# Patient Record
Sex: MALE | Race: WHITE | NOT HISPANIC OR LATINO | Employment: OTHER | URBAN - METROPOLITAN AREA
[De-identification: names, ages, dates, MRNs, and addresses within clinical notes are randomized per-mention and may not be internally consistent; named-entity substitution may affect disease eponyms.]

---

## 2017-03-17 ENCOUNTER — TRANSCRIBE ORDERS (OUTPATIENT)
Dept: ADMINISTRATIVE | Facility: HOSPITAL | Age: 57
End: 2017-03-17

## 2017-03-17 DIAGNOSIS — Z13.9 SCREENING: Primary | ICD-10-CM

## 2017-04-04 ENCOUNTER — HOSPITAL ENCOUNTER (OUTPATIENT)
Dept: CARDIOLOGY | Facility: HOSPITAL | Age: 57
Discharge: HOME | End: 2017-04-04
Attending: INTERNAL MEDICINE | Admitting: INTERNAL MEDICINE

## 2017-04-04 VITALS
DIASTOLIC BLOOD PRESSURE: 71 MMHG | WEIGHT: 196 LBS | HEART RATE: 79 BPM | SYSTOLIC BLOOD PRESSURE: 127 MMHG | HEIGHT: 67 IN | BODY MASS INDEX: 30.76 KG/M2

## 2017-04-04 DIAGNOSIS — Z13.9 SCREENING: ICD-10-CM

## 2017-04-04 LAB
BH CV ECHO MEAS - DIST AO DIAM: 1.42 CM
BH CV VAS BP LEFT ARM: NORMAL MMHG
BH CV VAS BP RIGHT ARM: NORMAL MMHG
BH CV XLRA MEAS - MID AO DIAM: 1.46 CM
BH CV XLRA MEAS - PAD LEFT ABI DP: 1.02
BH CV XLRA MEAS - PAD LEFT ABI PT: 1
BH CV XLRA MEAS - PAD LEFT ARM: 127 MMHG
BH CV XLRA MEAS - PAD LEFT LEG DP: 130 MMHG
BH CV XLRA MEAS - PAD LEFT LEG PT: 128 MMHG
BH CV XLRA MEAS - PAD RIGHT ABI DP: NORMAL
BH CV XLRA MEAS - PAD RIGHT ABI PT: NORMAL
BH CV XLRA MEAS - PAD RIGHT ARM: NORMAL MMHG
BH CV XLRA MEAS - PAD RIGHT LEG DP: NORMAL MMHG
BH CV XLRA MEAS - PAD RIGHT LEG PT: NORMAL MMHG
BH CV XLRA MEAS - PROX AO DIAM: 1.55 CM
BH CV XLRA MEAS LEFT ICA/CCA RATIO: 1.33
BH CV XLRA MEAS LEFT MID CCA PSV: NORMAL CM/SEC
BH CV XLRA MEAS LEFT MID ICA PSV: NORMAL CM/SEC
BH CV XLRA MEAS LEFT PROX ECA PSV: NORMAL CM/SEC
BH CV XLRA MEAS RIGHT ICA/CCA RATIO: 1.03
BH CV XLRA MEAS RIGHT MID CCA PSV: NORMAL CM/SEC
BH CV XLRA MEAS RIGHT MID ICA PSV: NORMAL CM/SEC
BH CV XLRA MEAS RIGHT PROX ECA PSV: NORMAL CM/SEC

## 2017-04-04 PROCEDURE — 93799 UNLISTED CV SVC/PROCEDURE: CPT

## 2019-12-25 ENCOUNTER — HOSPITAL ENCOUNTER (EMERGENCY)
Facility: HOSPITAL | Age: 59
Discharge: HOME | End: 2019-12-25
Attending: EMERGENCY MEDICINE | Admitting: EMERGENCY MEDICINE

## 2019-12-25 ENCOUNTER — APPOINTMENT (OUTPATIENT)
Dept: CT IMAGING | Facility: HOSPITAL | Age: 59
End: 2019-12-25

## 2019-12-25 ENCOUNTER — APPOINTMENT (OUTPATIENT)
Dept: GENERAL RADIOLOGY | Facility: HOSPITAL | Age: 59
End: 2019-12-25

## 2019-12-25 VITALS
HEART RATE: 85 BPM | TEMPERATURE: 98.3 F | SYSTOLIC BLOOD PRESSURE: 150 MMHG | RESPIRATION RATE: 18 BRPM | OXYGEN SATURATION: 96 % | HEIGHT: 67 IN | BODY MASS INDEX: 31.55 KG/M2 | DIASTOLIC BLOOD PRESSURE: 94 MMHG | WEIGHT: 201 LBS

## 2019-12-25 DIAGNOSIS — G57.91 NEUROPATHY OF RIGHT LOWER EXTREMITY: ICD-10-CM

## 2019-12-25 DIAGNOSIS — J18.9 PNEUMONIA OF RIGHT UPPER LOBE DUE TO INFECTIOUS ORGANISM: Primary | ICD-10-CM

## 2019-12-25 LAB
ALBUMIN SERPL-MCNC: 3.8 G/DL (ref 3.5–5.2)
ALBUMIN/GLOB SERPL: 1.3 G/DL
ALP SERPL-CCNC: 87 U/L (ref 39–117)
ALT SERPL W P-5'-P-CCNC: 44 U/L (ref 1–41)
ANION GAP SERPL CALCULATED.3IONS-SCNC: 12 MMOL/L (ref 5–15)
AST SERPL-CCNC: 26 U/L (ref 1–40)
BASOPHILS # BLD AUTO: 0.04 10*3/MM3 (ref 0–0.2)
BASOPHILS NFR BLD AUTO: 0.3 % (ref 0–1.5)
BILIRUB SERPL-MCNC: 1.1 MG/DL (ref 0.2–1.2)
BUN BLD-MCNC: 17 MG/DL (ref 6–20)
BUN/CREAT SERPL: 15 (ref 7–25)
CALCIUM SPEC-SCNC: 9.2 MG/DL (ref 8.6–10.5)
CHLORIDE SERPL-SCNC: 107 MMOL/L (ref 98–107)
CO2 SERPL-SCNC: 23 MMOL/L (ref 22–29)
CREAT BLD-MCNC: 1.13 MG/DL (ref 0.76–1.27)
DEPRECATED RDW RBC AUTO: 42.8 FL (ref 37–54)
EOSINOPHIL # BLD AUTO: 0.43 10*3/MM3 (ref 0–0.4)
EOSINOPHIL NFR BLD AUTO: 3 % (ref 0.3–6.2)
ERYTHROCYTE [DISTWIDTH] IN BLOOD BY AUTOMATED COUNT: 12.4 % (ref 12.3–15.4)
FLUAV AG NPH QL: NEGATIVE
FLUBV AG NPH QL IA: NEGATIVE
GFR SERPL CREATININE-BSD FRML MDRD: 67 ML/MIN/1.73
GLOBULIN UR ELPH-MCNC: 2.9 GM/DL
GLUCOSE BLD-MCNC: 98 MG/DL (ref 65–99)
HCT VFR BLD AUTO: 44.3 % (ref 37.5–51)
HGB BLD-MCNC: 14.9 G/DL (ref 13–17.7)
IMM GRANULOCYTES # BLD AUTO: 0.09 10*3/MM3 (ref 0–0.05)
IMM GRANULOCYTES NFR BLD AUTO: 0.6 % (ref 0–0.5)
LYMPHOCYTES # BLD AUTO: 0.94 10*3/MM3 (ref 0.7–3.1)
LYMPHOCYTES NFR BLD AUTO: 6.7 % (ref 19.6–45.3)
MCH RBC QN AUTO: 32.1 PG (ref 26.6–33)
MCHC RBC AUTO-ENTMCNC: 33.6 G/DL (ref 31.5–35.7)
MCV RBC AUTO: 95.5 FL (ref 79–97)
MONOCYTES # BLD AUTO: 1.25 10*3/MM3 (ref 0.1–0.9)
MONOCYTES NFR BLD AUTO: 8.8 % (ref 5–12)
NEUTROPHILS # BLD AUTO: 11.38 10*3/MM3 (ref 1.7–7)
NEUTROPHILS NFR BLD AUTO: 80.6 % (ref 42.7–76)
NRBC BLD AUTO-RTO: 0 /100 WBC (ref 0–0.2)
PLATELET # BLD AUTO: 255 10*3/MM3 (ref 140–450)
PMV BLD AUTO: 9.5 FL (ref 6–12)
POTASSIUM BLD-SCNC: 4.1 MMOL/L (ref 3.5–5.2)
PROT SERPL-MCNC: 6.7 G/DL (ref 6–8.5)
RBC # BLD AUTO: 4.64 10*6/MM3 (ref 4.14–5.8)
SODIUM BLD-SCNC: 142 MMOL/L (ref 136–145)
WBC NRBC COR # BLD: 14.13 10*3/MM3 (ref 3.4–10.8)

## 2019-12-25 PROCEDURE — 71260 CT THORAX DX C+: CPT

## 2019-12-25 PROCEDURE — 71046 X-RAY EXAM CHEST 2 VIEWS: CPT

## 2019-12-25 PROCEDURE — 96375 TX/PRO/DX INJ NEW DRUG ADDON: CPT

## 2019-12-25 PROCEDURE — 96376 TX/PRO/DX INJ SAME DRUG ADON: CPT

## 2019-12-25 PROCEDURE — 0 IOPAMIDOL PER 1 ML: Performed by: EMERGENCY MEDICINE

## 2019-12-25 PROCEDURE — 25010000002 HYDROMORPHONE PER 4 MG: Performed by: EMERGENCY MEDICINE

## 2019-12-25 PROCEDURE — 99284 EMERGENCY DEPT VISIT MOD MDM: CPT

## 2019-12-25 PROCEDURE — 99284 EMERGENCY DEPT VISIT MOD MDM: CPT | Performed by: EMERGENCY MEDICINE

## 2019-12-25 PROCEDURE — 85025 COMPLETE CBC W/AUTO DIFF WBC: CPT | Performed by: EMERGENCY MEDICINE

## 2019-12-25 PROCEDURE — 96374 THER/PROPH/DIAG INJ IV PUSH: CPT

## 2019-12-25 PROCEDURE — 80053 COMPREHEN METABOLIC PANEL: CPT | Performed by: EMERGENCY MEDICINE

## 2019-12-25 PROCEDURE — 25010000002 LORAZEPAM PER 2 MG: Performed by: EMERGENCY MEDICINE

## 2019-12-25 PROCEDURE — 87804 INFLUENZA ASSAY W/OPTIC: CPT | Performed by: EMERGENCY MEDICINE

## 2019-12-25 RX ORDER — LEVOTHYROXINE SODIUM 0.12 MG/1
125 TABLET ORAL EVERY EVENING
COMMUNITY

## 2019-12-25 RX ORDER — SIMVASTATIN 40 MG
40 TABLET ORAL EVERY OTHER DAY
COMMUNITY

## 2019-12-25 RX ORDER — SODIUM CHLORIDE 0.9 % (FLUSH) 0.9 %
10 SYRINGE (ML) INJECTION AS NEEDED
Status: DISCONTINUED | OUTPATIENT
Start: 2019-12-25 | End: 2019-12-25 | Stop reason: HOSPADM

## 2019-12-25 RX ORDER — HYDROMORPHONE HCL 110MG/55ML
0.5 PATIENT CONTROLLED ANALGESIA SYRINGE INTRAVENOUS ONCE
Status: COMPLETED | OUTPATIENT
Start: 2019-12-25 | End: 2019-12-25

## 2019-12-25 RX ORDER — LORAZEPAM 1 MG/1
1 TABLET ORAL 2 TIMES DAILY PRN
Qty: 15 TABLET | Refills: 0 | Status: SHIPPED | OUTPATIENT
Start: 2019-12-25

## 2019-12-25 RX ORDER — HYDROCODONE BITARTRATE AND ACETAMINOPHEN 5; 325 MG/1; MG/1
1 TABLET ORAL EVERY 4 HOURS PRN
Qty: 15 TABLET | Refills: 0 | Status: SHIPPED | OUTPATIENT
Start: 2019-12-25 | End: 2020-04-10 | Stop reason: HOSPADM

## 2019-12-25 RX ORDER — LORAZEPAM 2 MG/ML
1 INJECTION INTRAMUSCULAR ONCE
Status: COMPLETED | OUTPATIENT
Start: 2019-12-25 | End: 2019-12-25

## 2019-12-25 RX ORDER — GABAPENTIN 300 MG/1
300 CAPSULE ORAL 3 TIMES DAILY
Qty: 90 CAPSULE | Refills: 0 | Status: SHIPPED | OUTPATIENT
Start: 2019-12-25

## 2019-12-25 RX ORDER — DICLOFENAC SODIUM AND MISOPROSTOL 75; 200 MG/1; UG/1
1 TABLET, DELAYED RELEASE ORAL 2 TIMES DAILY
COMMUNITY
End: 2019-12-25

## 2019-12-25 RX ORDER — AZITHROMYCIN 250 MG/1
TABLET, FILM COATED ORAL
Qty: 4 TABLET | Refills: 0 | Status: SHIPPED | OUTPATIENT
Start: 2019-12-25 | End: 2020-04-08

## 2019-12-25 RX ORDER — NAPROXEN SODIUM 220 MG
220 TABLET ORAL 2 TIMES DAILY PRN
COMMUNITY
End: 2019-12-25

## 2019-12-25 RX ORDER — AZITHROMYCIN 250 MG/1
500 TABLET, FILM COATED ORAL ONCE
Status: COMPLETED | OUTPATIENT
Start: 2019-12-25 | End: 2019-12-25

## 2019-12-25 RX ADMIN — HYDROMORPHONE HYDROCHLORIDE 0.5 MG: 2 INJECTION, SOLUTION INTRAMUSCULAR; INTRAVENOUS; SUBCUTANEOUS at 11:29

## 2019-12-25 RX ADMIN — AZITHROMYCIN 500 MG: 250 TABLET, FILM COATED ORAL at 14:05

## 2019-12-25 RX ADMIN — HYDROMORPHONE HYDROCHLORIDE 0.5 MG: 2 INJECTION, SOLUTION INTRAMUSCULAR; INTRAVENOUS; SUBCUTANEOUS at 12:29

## 2019-12-25 RX ADMIN — LORAZEPAM 1 MG: 2 INJECTION INTRAMUSCULAR; INTRAVENOUS at 12:32

## 2019-12-25 RX ADMIN — IOPAMIDOL 100 ML: 755 INJECTION, SOLUTION INTRAVENOUS at 12:47

## 2019-12-25 NOTE — ED PROVIDER NOTES
EMERGENCY DEPARTMENT ENCOUNTER      Room Number: 5/05      HPI:    Chief complaint: Patient presents with upper respiratory symptoms and fever along with phantom leg pain.    Location: Phantom leg pain is in right lower extremity and originates near the point of prior AKA.    Quality/Severity: Fever to 102.5    Timing/Duration: Respiratory symptoms started approximately 3 days ago    Modifying Factors: Acetaminophen helps with fever    Associated Symptoms: General malaise    Narrative: Pt is a 59 y.o. male who presents complaining of upper respiratory symptoms, fever and phantom leg pain.  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  Patient does do note that whenever he is sick or has a fever the phantom pain is worse.  3-day history of cough, sore throat, hoarseness and fever.  Scant brown sputum production.  Patient recently exposed to a sick child.      PMD: Vasile Gross MD    REVIEW OF SYSTEMS  Review of Systems   Constitutional: Positive for activity change, appetite change, chills, fatigue and fever.   HENT: Positive for congestion (Nasal), sore throat and voice change. Negative for trouble swallowing.    Respiratory: Positive for cough. Negative for shortness of breath and wheezing.    Cardiovascular: Negative for chest pain and palpitations.   Gastrointestinal: Negative for abdominal pain, diarrhea, nausea and vomiting.   Genitourinary: Negative for dysuria, flank pain, hematuria and urgency.   Musculoskeletal: Positive for gait problem (Patient uses crutches for ambulation and has not used a prosthesis in several years). Negative for back pain.   Skin: Negative for rash.   Neurological: Negative for dizziness, weakness and headaches.   Psychiatric/Behavioral: Negative for confusion.   All other systems reviewed and are negative.      PAST MEDICAL HISTORY  Active Ambulatory Problems     Diagnosis Date Noted   • No Active  Ambulatory Problems     Resolved Ambulatory Problems     Diagnosis Date Noted   • No Resolved Ambulatory Problems     Past Medical History:   Diagnosis Date   • Disease of thyroid gland    • Hyperlipidemia        PAST SURGICAL HISTORY  Past Surgical History:   Procedure Laterality Date   • BELOW KNEE LEG AMPUTATION  1989    right leg   • SINUS SURGERY         FAMILY HISTORY  Family History   Problem Relation Age of Onset   • Transient ischemic attack Mother    • Heart attack Brother    • Diabetes Brother    • Heart disease Brother         CAD w/ CABG       SOCIAL HISTORY  Social History     Socioeconomic History   • Marital status:      Spouse name: Not on file   • Number of children: Not on file   • Years of education: Not on file   • Highest education level: Not on file   Tobacco Use   • Smoking status: Never Smoker   Substance and Sexual Activity   • Alcohol use: Yes     Comment: rarely       ALLERGIES  Latex    PHYSICAL EXAM  ED Triage Vitals [12/25/19 1001]   Temp Heart Rate Resp BP SpO2   98.3 °F (36.8 °C) 80 18 169/99 97 %      Temp src Heart Rate Source Patient Position BP Location FiO2 (%)   -- -- -- -- --       Physical Exam   Constitutional: He is oriented to person, place, and time and well-developed, well-nourished, and in no distress.   HENT:   Head: Normocephalic and atraumatic.   Eyes: Conjunctivae and EOM are normal.   Neck: Normal range of motion. Neck supple. No thyromegaly present.   Cardiovascular: Normal rate and normal heart sounds.   No murmur heard.  Pulmonary/Chest: Effort normal and breath sounds normal. No respiratory distress.   Occasional dry cough noted   Abdominal: Soft. Bowel sounds are normal. There is no tenderness.   Musculoskeletal: He exhibits no edema.   Right AKA and stump with benign appearance   Lymphadenopathy:     He has no cervical adenopathy.   Neurological: He is alert and oriented to person, place, and time.   Skin: Skin is warm and dry.   Psychiatric: Affect  and judgment normal.   Nursing note and vitals reviewed.      LAB RESULTS  Results for orders placed or performed during the hospital encounter of 12/25/19   Influenza Antigen, Rapid - Swab, Nasopharynx   Result Value Ref Range    Influenza A Ag, EIA Negative Negative    Influenza B Ag, EIA Negative Negative   Comprehensive Metabolic Panel   Result Value Ref Range    Glucose 98 65 - 99 mg/dL    BUN 17 6 - 20 mg/dL    Creatinine 1.13 0.76 - 1.27 mg/dL    Sodium 142 136 - 145 mmol/L    Potassium 4.1 3.5 - 5.2 mmol/L    Chloride 107 98 - 107 mmol/L    CO2 23.0 22.0 - 29.0 mmol/L    Calcium 9.2 8.6 - 10.5 mg/dL    Total Protein 6.7 6.0 - 8.5 g/dL    Albumin 3.80 3.50 - 5.20 g/dL    ALT (SGPT) 44 (H) 1 - 41 U/L    AST (SGOT) 26 1 - 40 U/L    Alkaline Phosphatase 87 39 - 117 U/L    Total Bilirubin 1.1 0.2 - 1.2 mg/dL    eGFR Non African Amer 67 >60 mL/min/1.73    Globulin 2.9 gm/dL    A/G Ratio 1.3 g/dL    BUN/Creatinine Ratio 15.0 7.0 - 25.0    Anion Gap 12.0 5.0 - 15.0 mmol/L   CBC Auto Differential   Result Value Ref Range    WBC 14.13 (H) 3.40 - 10.80 10*3/mm3    RBC 4.64 4.14 - 5.80 10*6/mm3    Hemoglobin 14.9 13.0 - 17.7 g/dL    Hematocrit 44.3 37.5 - 51.0 %    MCV 95.5 79.0 - 97.0 fL    MCH 32.1 26.6 - 33.0 pg    MCHC 33.6 31.5 - 35.7 g/dL    RDW 12.4 12.3 - 15.4 %    RDW-SD 42.8 37.0 - 54.0 fl    MPV 9.5 6.0 - 12.0 fL    Platelets 255 140 - 450 10*3/mm3    Neutrophil % 80.6 (H) 42.7 - 76.0 %    Lymphocyte % 6.7 (L) 19.6 - 45.3 %    Monocyte % 8.8 5.0 - 12.0 %    Eosinophil % 3.0 0.3 - 6.2 %    Basophil % 0.3 0.0 - 1.5 %    Immature Grans % 0.6 (H) 0.0 - 0.5 %    Neutrophils, Absolute 11.38 (H) 1.70 - 7.00 10*3/mm3    Lymphocytes, Absolute 0.94 0.70 - 3.10 10*3/mm3    Monocytes, Absolute 1.25 (H) 0.10 - 0.90 10*3/mm3    Eosinophils, Absolute 0.43 (H) 0.00 - 0.40 10*3/mm3    Basophils, Absolute 0.04 0.00 - 0.20 10*3/mm3    Immature Grans, Absolute 0.09 (H) 0.00 - 0.05 10*3/mm3    nRBC 0.0 0.0 - 0.2 /100 WBC          I ordered the above labs and reviewed the results    RADIOLOGY  Xr Chest 2 View    Result Date: 12/25/2019  Narrative: CR Chest 2 Vws INDICATION:  Congestion and coughing for almost 3 weeks which is worsening COMPARISON:  None. FINDINGS: PA and lateral views of the chest.  There is an irregular density in the right perihilar region measuring up to 3.5 cm in diameter. Rest the lungs are clear. The heart size normal. The bones are normal      Impression: 3.5 cm irregular density in the right perihilar region. A mass cannot be excluded. Given the history I suggest CT chest with contrast. Pneumonia is still possibility Signer Name: Marek Mathew MD  Signed: 12/25/2019 12:07 PM  Workstation Name: [email or web address]    Ct Chest With Contrast    Result Date: 12/25/2019  Narrative: INDICATION: Congestion and coughing for 3 weeks. Abnormal chest x-ray suggesting right lung mass. TECHNIQUE: CT of the thorax with contrast. Coronal and sagittal reconstructions were obtained.  Radiation dose reduction techniques included automated exposure control or exposure modulation based on body size. Radiation audit for number of CT and nuclear cardiology exams performed in the last year: 0.  COMPARISON: Chest x-ray same day FINDINGS: . There is patchy infiltrate in the right upper lobe anteriorly. In total this involves an area about 9 cm maximum dimension. There are bronchograms. The airways are patent. The aorta is normal in size. Thyroid gland is normal. No adenopathy is identified. Upper abdominal images are normal. Bones are unremarkable.     Impression: Right lung infiltrate with patchy distribution involving the upper lobe. There is no suggestion of a mass. There is no adenopathy. Recommend follow-up chest x-ray after appropriate treatment and resolution of symptoms to ensure resolution of right lung abnormality. Otherwise study is negative Signer Name: Marek Mathew MD  Signed: 12/25/2019 1:04 PM   Workstation Name: [email or web address]  [facility] [place]      I ordered the above radiologic testing and reviewed the results    PROCEDURES  Procedures      PROGRESS AND CONSULTS  ED Course as of Dec 25 1530   Wed Dec 25, 2019   1313 Prior to chest CT patient and wife were apprised of all results and it was felt that round pneumonia was the most probable etiology.  Final CT results does confirm a pneumonia.    [ML]   1528 Diagnosis of right upper lobe pneumonia discussed with patient along with treatment plan, expectations and warnings.  Neuropathic right lower extremity pain also discussed along with the treatment plan using Neurontin.  Patient was advised to follow-up with a neurologist if he continues to have significant problems with phantom pain.    [ML]      ED Course User Index  [ML] Jose Maria Rea MD           MEDICAL DECISION MAKING  Results were reviewed/discussed with the patient and they were also made aware of online access. Pt also made aware that some labs, such as cultures, will not be resulted during ER visit and follow up with PMD is necessary.     MDM       DIAGNOSIS  Final diagnoses:   Pneumonia of right upper lobe due to infectious organism (CMS/HCC)   Neuropathy of right lower extremity       Latest Documented Vital Signs:  As of 3:30 PM  BP- 150/94 HR- 85 Temp- 98.3 °F (36.8 °C) O2 sat- 96%    DISPOSITION  Discharged in stable condition       Medication List      New Prescriptions    azithromycin 250 MG tablet  Commonly known as:  ZITHROMAX  1 tablet daily for 4 days.     gabapentin 300 MG capsule  Commonly known as:  NEURONTIN  Take 1 capsule by mouth 3 (Three) Times a Day.     HYDROcodone-acetaminophen 5-325 MG per tablet  Commonly known as:  NORCO  Take 1 tablet by mouth Every 4 (Four) Hours As Needed for Moderate Pain    for up to 15 doses.     LORazepam 1 MG tablet  Commonly known as:  ATIVAN  Take 1 tablet by mouth 2 (Two) Times a Day As Needed for Anxiety or Sleep.         Stop    diclofenac-miSOPROStol 75-0.2 MG EC tablet  Commonly known as:  ARTHROTEC 75     naproxen sodium 220 MG tablet  Commonly known as:  ALEVE          Follow-up Information     Vasile Gross MD.    Specialty:  Internal Medicine  Why:  As needed  Contact information:  [address] [place] [id number] [phone]                      Jose Maria Rea MD  12/25/19 0997

## 2020-04-08 ENCOUNTER — APPOINTMENT (OUTPATIENT)
Dept: GENERAL RADIOLOGY | Facility: HOSPITAL | Age: 60
End: 2020-04-08

## 2020-04-08 ENCOUNTER — APPOINTMENT (OUTPATIENT)
Dept: CT IMAGING | Facility: HOSPITAL | Age: 60
End: 2020-04-08

## 2020-04-08 ENCOUNTER — HOSPITAL ENCOUNTER (INPATIENT)
Facility: HOSPITAL | Age: 60
LOS: 2 days | Discharge: HOME HEALTH | End: 2020-04-10
Attending: EMERGENCY MEDICINE | Admitting: HOSPITALIST

## 2020-04-08 ENCOUNTER — PREP FOR SURGERY (OUTPATIENT)
Dept: OTHER | Facility: HOSPITAL | Age: 60
End: 2020-04-08

## 2020-04-08 DIAGNOSIS — S72.144A CLOSED NONDISPLACED INTERTROCHANTERIC FRACTURE OF RIGHT FEMUR, INITIAL ENCOUNTER (HCC): Primary | ICD-10-CM

## 2020-04-08 DIAGNOSIS — S72.141A CLOSED DISPLACED INTERTROCHANTERIC FRACTURE OF RIGHT FEMUR, INITIAL ENCOUNTER (HCC): ICD-10-CM

## 2020-04-08 LAB
ABO GROUP BLD: NORMAL
ABO GROUP BLD: NORMAL
ALBUMIN SERPL-MCNC: 3.8 G/DL (ref 3.5–5.2)
ALBUMIN/GLOB SERPL: 1.4 G/DL
ALP SERPL-CCNC: 81 U/L (ref 39–117)
ALT SERPL W P-5'-P-CCNC: 48 U/L (ref 1–41)
ANION GAP SERPL CALCULATED.3IONS-SCNC: 12 MMOL/L (ref 5–15)
AST SERPL-CCNC: 34 U/L (ref 1–40)
BASOPHILS # BLD AUTO: 0.04 10*3/MM3 (ref 0–0.2)
BASOPHILS NFR BLD AUTO: 0.5 % (ref 0–1.5)
BILIRUB SERPL-MCNC: 1.2 MG/DL (ref 0.2–1.2)
BLD GP AB SCN SERPL QL: NEGATIVE
BUN BLD-MCNC: 15 MG/DL (ref 6–20)
BUN/CREAT SERPL: 12.4 (ref 7–25)
CALCIUM SPEC-SCNC: 8.9 MG/DL (ref 8.6–10.5)
CHLORIDE SERPL-SCNC: 105 MMOL/L (ref 98–107)
CO2 SERPL-SCNC: 20 MMOL/L (ref 22–29)
CREAT BLD-MCNC: 1.21 MG/DL (ref 0.76–1.27)
DEPRECATED RDW RBC AUTO: 40.8 FL (ref 37–54)
EOSINOPHIL # BLD AUTO: 0.22 10*3/MM3 (ref 0–0.4)
EOSINOPHIL NFR BLD AUTO: 2.6 % (ref 0.3–6.2)
ERYTHROCYTE [DISTWIDTH] IN BLOOD BY AUTOMATED COUNT: 11.8 % (ref 12.3–15.4)
GFR SERPL CREATININE-BSD FRML MDRD: 62 ML/MIN/1.73
GLOBULIN UR ELPH-MCNC: 2.8 GM/DL
GLUCOSE BLD-MCNC: 100 MG/DL (ref 65–99)
HCT VFR BLD AUTO: 47.7 % (ref 37.5–51)
HGB BLD-MCNC: 16.2 G/DL (ref 13–17.7)
HOLD SPECIMEN: NORMAL
HOLD SPECIMEN: NORMAL
IMM GRANULOCYTES # BLD AUTO: 0.03 10*3/MM3 (ref 0–0.05)
IMM GRANULOCYTES NFR BLD AUTO: 0.4 % (ref 0–0.5)
LYMPHOCYTES # BLD AUTO: 1.31 10*3/MM3 (ref 0.7–3.1)
LYMPHOCYTES NFR BLD AUTO: 15.7 % (ref 19.6–45.3)
MCH RBC QN AUTO: 31.9 PG (ref 26.6–33)
MCHC RBC AUTO-ENTMCNC: 34 G/DL (ref 31.5–35.7)
MCV RBC AUTO: 93.9 FL (ref 79–97)
MONOCYTES # BLD AUTO: 0.63 10*3/MM3 (ref 0.1–0.9)
MONOCYTES NFR BLD AUTO: 7.5 % (ref 5–12)
NEUTROPHILS # BLD AUTO: 6.14 10*3/MM3 (ref 1.7–7)
NEUTROPHILS NFR BLD AUTO: 73.3 % (ref 42.7–76)
PLATELET # BLD AUTO: 263 10*3/MM3 (ref 140–450)
PMV BLD AUTO: 9.8 FL (ref 6–12)
POTASSIUM BLD-SCNC: 4.1 MMOL/L (ref 3.5–5.2)
PROT SERPL-MCNC: 6.6 G/DL (ref 6–8.5)
RBC # BLD AUTO: 5.08 10*6/MM3 (ref 4.14–5.8)
RH BLD: POSITIVE
RH BLD: POSITIVE
SODIUM BLD-SCNC: 137 MMOL/L (ref 136–145)
T&S EXPIRATION DATE: NORMAL
T4 FREE SERPL-MCNC: 1.53 NG/DL (ref 0.93–1.7)
TSH SERPL DL<=0.05 MIU/L-ACNC: 1.24 UIU/ML (ref 0.27–4.2)
WBC NRBC COR # BLD: 8.37 10*3/MM3 (ref 3.4–10.8)
WHOLE BLOOD HOLD SPECIMEN: NORMAL
WHOLE BLOOD HOLD SPECIMEN: NORMAL

## 2020-04-08 PROCEDURE — 84443 ASSAY THYROID STIM HORMONE: CPT | Performed by: PHYSICIAN ASSISTANT

## 2020-04-08 PROCEDURE — 86901 BLOOD TYPING SEROLOGIC RH(D): CPT

## 2020-04-08 PROCEDURE — 73502 X-RAY EXAM HIP UNI 2-3 VIEWS: CPT

## 2020-04-08 PROCEDURE — 85025 COMPLETE CBC W/AUTO DIFF WBC: CPT | Performed by: PHYSICIAN ASSISTANT

## 2020-04-08 PROCEDURE — 80053 COMPREHEN METABOLIC PANEL: CPT | Performed by: PHYSICIAN ASSISTANT

## 2020-04-08 PROCEDURE — 86900 BLOOD TYPING SEROLOGIC ABO: CPT

## 2020-04-08 PROCEDURE — 72110 X-RAY EXAM L-2 SPINE 4/>VWS: CPT

## 2020-04-08 PROCEDURE — 99284 EMERGENCY DEPT VISIT MOD MDM: CPT | Performed by: PHYSICIAN ASSISTANT

## 2020-04-08 PROCEDURE — 99254 IP/OBS CNSLTJ NEW/EST MOD 60: CPT | Performed by: ORTHOPAEDIC SURGERY

## 2020-04-08 PROCEDURE — 99222 1ST HOSP IP/OBS MODERATE 55: CPT | Performed by: HOSPITALIST

## 2020-04-08 PROCEDURE — 25010000002 HYDROMORPHONE PER 4 MG: Performed by: EMERGENCY MEDICINE

## 2020-04-08 PROCEDURE — G0378 HOSPITAL OBSERVATION PER HR: HCPCS

## 2020-04-08 PROCEDURE — 86900 BLOOD TYPING SEROLOGIC ABO: CPT | Performed by: PHYSICIAN ASSISTANT

## 2020-04-08 PROCEDURE — 25010000002 HYDROMORPHONE PER 4 MG: Performed by: ORTHOPAEDIC SURGERY

## 2020-04-08 PROCEDURE — 93005 ELECTROCARDIOGRAM TRACING: CPT | Performed by: PHYSICIAN ASSISTANT

## 2020-04-08 PROCEDURE — 25010000002 ONDANSETRON PER 1 MG: Performed by: EMERGENCY MEDICINE

## 2020-04-08 PROCEDURE — 94770: CPT

## 2020-04-08 PROCEDURE — 73700 CT LOWER EXTREMITY W/O DYE: CPT

## 2020-04-08 PROCEDURE — 86850 RBC ANTIBODY SCREEN: CPT | Performed by: PHYSICIAN ASSISTANT

## 2020-04-08 PROCEDURE — 93010 ELECTROCARDIOGRAM REPORT: CPT | Performed by: INTERNAL MEDICINE

## 2020-04-08 PROCEDURE — 25010000002 HYDROMORPHONE PER 4 MG: Performed by: HOSPITALIST

## 2020-04-08 PROCEDURE — 94799 UNLISTED PULMONARY SVC/PX: CPT

## 2020-04-08 PROCEDURE — 71045 X-RAY EXAM CHEST 1 VIEW: CPT

## 2020-04-08 PROCEDURE — 86901 BLOOD TYPING SEROLOGIC RH(D): CPT | Performed by: PHYSICIAN ASSISTANT

## 2020-04-08 PROCEDURE — 84439 ASSAY OF FREE THYROXINE: CPT | Performed by: PHYSICIAN ASSISTANT

## 2020-04-08 PROCEDURE — 99283 EMERGENCY DEPT VISIT LOW MDM: CPT

## 2020-04-08 RX ORDER — MULTIPLE VITAMINS W/ MINERALS TAB 9MG-400MCG
1 TAB ORAL DAILY
COMMUNITY

## 2020-04-08 RX ORDER — SODIUM CHLORIDE 0.9 % (FLUSH) 0.9 %
10 SYRINGE (ML) INJECTION AS NEEDED
Status: DISCONTINUED | OUTPATIENT
Start: 2020-04-08 | End: 2020-04-10 | Stop reason: HOSPADM

## 2020-04-08 RX ORDER — SODIUM CHLORIDE 9 MG/ML
40 INJECTION, SOLUTION INTRAVENOUS AS NEEDED
Status: DISCONTINUED | OUTPATIENT
Start: 2020-04-08 | End: 2020-04-10 | Stop reason: HOSPADM

## 2020-04-08 RX ORDER — ACETAMINOPHEN 500 MG
1000 TABLET ORAL ONCE
Status: CANCELLED | OUTPATIENT
Start: 2020-04-09

## 2020-04-08 RX ORDER — LORATADINE 10 MG/1
10 TABLET ORAL DAILY
COMMUNITY

## 2020-04-08 RX ORDER — HYDROMORPHONE HCL 110MG/55ML
0.5 PATIENT CONTROLLED ANALGESIA SYRINGE INTRAVENOUS
Status: DISCONTINUED | OUTPATIENT
Start: 2020-04-08 | End: 2020-04-10 | Stop reason: HOSPADM

## 2020-04-08 RX ORDER — DOCUSATE SODIUM 100 MG/1
100 CAPSULE, LIQUID FILLED ORAL 2 TIMES DAILY
Status: DISCONTINUED | OUTPATIENT
Start: 2020-04-08 | End: 2020-04-10 | Stop reason: HOSPADM

## 2020-04-08 RX ORDER — HYDROCODONE BITARTRATE AND ACETAMINOPHEN 5; 325 MG/1; MG/1
1 TABLET ORAL EVERY 4 HOURS PRN
Status: DISCONTINUED | OUTPATIENT
Start: 2020-04-08 | End: 2020-04-09

## 2020-04-08 RX ORDER — LORAZEPAM 1 MG/1
1 TABLET ORAL 2 TIMES DAILY PRN
Status: DISCONTINUED | OUTPATIENT
Start: 2020-04-08 | End: 2020-04-10 | Stop reason: HOSPADM

## 2020-04-08 RX ORDER — ONDANSETRON 2 MG/ML
4 INJECTION INTRAMUSCULAR; INTRAVENOUS EVERY 6 HOURS PRN
Status: DISCONTINUED | OUTPATIENT
Start: 2020-04-08 | End: 2020-04-10 | Stop reason: HOSPADM

## 2020-04-08 RX ORDER — ONDANSETRON 4 MG/1
4 TABLET, FILM COATED ORAL EVERY 6 HOURS PRN
Status: DISCONTINUED | OUTPATIENT
Start: 2020-04-08 | End: 2020-04-10 | Stop reason: HOSPADM

## 2020-04-08 RX ORDER — MULTIVIT WITH MINERALS/LUTEIN
1000 TABLET ORAL DAILY
COMMUNITY

## 2020-04-08 RX ORDER — PREGABALIN 75 MG/1
75 CAPSULE ORAL ONCE
Status: CANCELLED | OUTPATIENT
Start: 2020-04-09

## 2020-04-08 RX ORDER — ASPIRIN 81 MG/1
81 TABLET ORAL DAILY
COMMUNITY
End: 2020-04-10 | Stop reason: HOSPADM

## 2020-04-08 RX ORDER — ATORVASTATIN CALCIUM 20 MG/1
20 TABLET, FILM COATED ORAL DAILY
Status: DISCONTINUED | OUTPATIENT
Start: 2020-04-08 | End: 2020-04-10 | Stop reason: HOSPADM

## 2020-04-08 RX ORDER — GABAPENTIN 300 MG/1
300 CAPSULE ORAL 3 TIMES DAILY
Status: DISCONTINUED | OUTPATIENT
Start: 2020-04-08 | End: 2020-04-10 | Stop reason: HOSPADM

## 2020-04-08 RX ORDER — HYDROMORPHONE HCL 110MG/55ML
0.5 PATIENT CONTROLLED ANALGESIA SYRINGE INTRAVENOUS ONCE
Status: COMPLETED | OUTPATIENT
Start: 2020-04-08 | End: 2020-04-08

## 2020-04-08 RX ORDER — ERGOCALCIFEROL 1.25 MG/1
5000 CAPSULE ORAL DAILY
COMMUNITY

## 2020-04-08 RX ORDER — SODIUM CHLORIDE 0.9 % (FLUSH) 0.9 %
10 SYRINGE (ML) INJECTION EVERY 12 HOURS SCHEDULED
Status: DISCONTINUED | OUTPATIENT
Start: 2020-04-08 | End: 2020-04-10 | Stop reason: HOSPADM

## 2020-04-08 RX ORDER — ONDANSETRON 2 MG/ML
4 INJECTION INTRAMUSCULAR; INTRAVENOUS ONCE
Status: COMPLETED | OUTPATIENT
Start: 2020-04-08 | End: 2020-04-08

## 2020-04-08 RX ORDER — CEFAZOLIN SODIUM 2 G/50ML
2 SOLUTION INTRAVENOUS ONCE
Status: CANCELLED | OUTPATIENT
Start: 2020-04-08 | End: 2020-04-08

## 2020-04-08 RX ORDER — LEVOTHYROXINE SODIUM 0.12 MG/1
125 TABLET ORAL DAILY
Status: DISCONTINUED | OUTPATIENT
Start: 2020-04-08 | End: 2020-04-10 | Stop reason: HOSPADM

## 2020-04-08 RX ORDER — MULTIPLE VITAMINS W/ MINERALS TAB 9MG-400MCG
1 TAB ORAL DAILY
Status: DISCONTINUED | OUTPATIENT
Start: 2020-04-08 | End: 2020-04-10 | Stop reason: HOSPADM

## 2020-04-08 RX ADMIN — HYDROMORPHONE HYDROCHLORIDE 0.5 MG: 2 INJECTION, SOLUTION INTRAMUSCULAR; INTRAVENOUS; SUBCUTANEOUS at 22:45

## 2020-04-08 RX ADMIN — HYDROMORPHONE HYDROCHLORIDE 0.5 MG: 2 INJECTION, SOLUTION INTRAMUSCULAR; INTRAVENOUS; SUBCUTANEOUS at 15:51

## 2020-04-08 RX ADMIN — HYDROCODONE BITARTRATE AND ACETAMINOPHEN 1 TABLET: 5; 325 TABLET ORAL at 17:21

## 2020-04-08 RX ADMIN — GABAPENTIN 300 MG: 300 CAPSULE ORAL at 20:49

## 2020-04-08 RX ADMIN — Medication 10 ML: at 22:20

## 2020-04-08 RX ADMIN — HYDROMORPHONE HYDROCHLORIDE 0.5 MG: 2 INJECTION, SOLUTION INTRAMUSCULAR; INTRAVENOUS; SUBCUTANEOUS at 13:47

## 2020-04-08 RX ADMIN — ONDANSETRON 4 MG: 2 INJECTION, SOLUTION INTRAMUSCULAR; INTRAVENOUS at 13:47

## 2020-04-08 RX ADMIN — DOCUSATE SODIUM 100 MG: 100 CAPSULE, LIQUID FILLED ORAL at 20:49

## 2020-04-08 RX ADMIN — HYDROCODONE BITARTRATE AND ACETAMINOPHEN 1 TABLET: 5; 325 TABLET ORAL at 21:27

## 2020-04-08 RX ADMIN — GABAPENTIN 300 MG: 300 CAPSULE ORAL at 17:21

## 2020-04-08 RX ADMIN — ATORVASTATIN CALCIUM 20 MG: 20 TABLET, FILM COATED ORAL at 17:21

## 2020-04-08 NOTE — CONSULTS
Orthopedic Consult      Patient: Charlie Solano    Date of Admission: 4/8/2020 12:35 PM    YOB: 1960    Medical Record Number: [id number]    Attending Physician: Anthony Gamble MD  Consulting Physician: Brenden      Chief Complaints: Right hip pain    History of Present Illness: 60 male is admitted to the emergency room here at [facility] after given a history of falling in his kitchen at his home landing on her right hip.  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  This time he is cancer free.  He does use a prosthesis to ambulate but was not wearing the prosthesis when he fall.  X-rays completed in the emergency room show a nondisplaced right intertrochanteric hip fracture and the patient admitted this time for definitive care.    Allergies:   Allergies   Allergen Reactions   • Latex Rash     swelling       Medications:   Home Medications:  No current facility-administered medications on file prior to encounter.      Current Outpatient Medications on File Prior to Encounter   Medication Sig   • levothyroxine (SYNTHROID, LEVOTHROID) 125 MCG tablet Take 125 mcg by mouth Daily.   • Multiple Vitamins-Minerals (MULTIVITAMIN WITH MINERALS) tablet tablet Take 1 tablet by mouth Daily.   • simvastatin (ZOCOR) 40 MG tablet Take 40 mg by mouth Every Night.   • gabapentin (NEURONTIN) 300 MG capsule Take 1 capsule by mouth 3 (Three) Times a Day.   • HYDROcodone-acetaminophen (NORCO) 5-325 MG per tablet Take 1 tablet by mouth Every 4 (Four) Hours As Needed for Moderate Pain  for up to 15 doses.   • LORazepam (ATIVAN) 1 MG tablet Take 1 tablet by mouth 2 (Two) Times a Day As Needed for Anxiety or Sleep.   • [DISCONTINUED] azithromycin (ZITHROMAX) 250 MG tablet 1 tablet daily for 4 days.     Current Medications:  Scheduled Meds:   Continuous Infusions:   PRN Meds:.•  [COMPLETED] Insert peripheral IV **AND** sodium  chloride    Past Medical History:   Diagnosis Date   • Disease of thyroid gland     Hypothyroid   • Hyperlipidemia     Controlled w/ SImvastatin   • Osteogenic sarcoma (CMS/HCC)     right leg        Past Surgical History:   Procedure Laterality Date   • ADENOIDECTOMY     • BELOW KNEE LEG AMPUTATION  1989    right leg   • SINUS SURGERY     • TONSILLECTOMY          Social History     Occupational History   • Not on file   Tobacco Use   • Smoking status: Never Smoker   Substance and Sexual Activity   • Alcohol use: Not Currently     Comment: rarely   • Drug use: Never   • Sexual activity: Defer      Social History     Social History Narrative   • Not on file        Family History   Problem Relation Age of Onset   • Transient ischemic attack Mother    • Heart attack Brother    • Diabetes Brother    • Heart disease Brother         CAD w/ CABG         Review of Systems:   HEENT: Patient denies any headaches, vision changes, change in hearing, or tinnitus, Patient denies any rhinorrhea,epistaxis, sinus pain, mouth or dental problems, sore throat or hoarseness, or dysphagia  Pulmonary: Patient denies any cough, congestion, SOA, or wheezing  Cardiovascular: Patient denies any chest pain, dyspnea, palpitations, weakness, intolerance of exercise, varicosities, swelling of extremities, known murmur  Gastrointestinal:  Patient denies nausea, vomiting, diarrhea, constipation, loss  of appetite, change in appetite, dysphagia, gas, heartburn, melena, change in bowel habits, use of laxatives or other drugs to alter the function of the gastrointestinal tract.  Genital/Urinary: Patient denies dysuria, change in color of urine, change in frequency of urination, pain with urgency, incontinence, retention, or nocturia.  Musculoskeletal: Patient denies increased warmth; redness; or swelling of joints; limitation of function; deformity; crepitation: pain in a joint or an extremity, the neck, or the back, especially with  "movement.  Neurological: Patient denies dizziness, tremor, ataxia, difficulty in speaking, change in speech, paresthesia, loss of sensation, seizures, syncope, changes in memory.  Endocrine system: Patient denies tremors, palpitations, intolerance of heat or cold, polyuria, polydipsia, polyphagia, diaphoresis, exophthalmos, or goiter.  Psychological: Patient denies thoughts/plans or harming self or other; depression,  insomnia, night terrors, cuca, memory loss, disorientation.  Skin: Patient denies any bruising, rashes, discoloration, pruritus, wounds, ulcers, decubiti, changes in the hair or nails  Hematopoietic: Patient denies history of spontaneous or excessive bleeding, epistaxis, hematuria, melena, fatigue, enlarged or tender lymph nodes, pallor, history of anemia.    Physical Exam: 60 y.o. male  General Appearance:    Alert, cooperative, in no acute distress                   Vitals:    04/08/20 1234 04/08/20 1414   BP: 142/87 152/92   BP Location: Right arm Left arm   Patient Position: Lying    Pulse: 69 70   Resp: 20 18   Temp: 98.8 °F (37.1 °C)    TempSrc: Oral    SpO2: 97% 93%   Weight: 97.1 kg (214 lb)    Height: 170 cm (66.93\")         Head:    Normocephalic, without obvious abnormality, atraumatic   Eyes:            Lids and lashes normal, conjunctivae and sclerae normal, no   icterus, no pallor, corneas clear, PERRLA   Ears:    Ears appear intact with no abnormalities noted   Throat:   No oral lesions, no thrush, oral mucosa moist   Neck:   No adenopathy, supple, trachea midline, no thyromegaly, no   carotid bruit, no JVD   Back:     No kyphosis present, no scoliosis present, no skin lesions,      erythema or scars, no tenderness to percussion or                   palpation,   range of motion normal   Lungs:     Clear to auscultation,respirations regular, even and                  unlabored    Heart:    Regular rhythm and normal rate, normal S1 and S2, no            murmur, no gallop, no rub, no click "   Chest Wall:    No abnormalities observed   Abdomen:     Normal bowel sounds, no masses, no organomegaly, soft        non-tender, non-distended, no guarding, no rebound                tenderness   Rectal:     Deferred   Extremities:    Examination of the right leg does show a well-healed right midshaft femoral stump.  The skin is intact there is no evident external bruising or ecchymosis noted.  Skin has good capillary refill.   Pulses:   Pulses palpable and equal bilaterally   Skin:   No bleeding, bruising or rash   Lymph nodes:   No palpable adenopathy   Neurologic:   Cranial nerves 2 - 12 grossly intact, sensation intact, DTR       present and equal bilaterally           Diagnostic Tests: X-rays show nondisplaced right intertrochanteric hip fracture.  We will also get preoperatively a CT scan with 3D reconstruction  [unfilled]      [unfilled]    Assessment: Nondisplaced right intertrochanteric hip fracture      Plan: Reviewed the x-rays with the patient and his wife via face time on the telephone documenting the intertrochanteric hip fracture.  Discussed treatment options of gamma nailing percutaneous screw fixation or plating.  Discussed we will proceed either with screw fixation or plating as the stump was not long enough for a intramedullary gamma nail.  Discussed the risk of surgery with the patient infection the need for multiple stages to eradicate infection, delayed union nonunion, failure of fixation, persistent pain discomfort the possibility that the patient can never weight-bear again with his prosthesis.  Discussed the healing 3 months or longer and he understands plan proceed with surgery tomorrow.          Date: 4/8/2020    Jorge Wilcox MD

## 2020-04-08 NOTE — H&P
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 Cobian  Labs ok  CT of hip.    Med surg/ no tele  Obs  Body mass index is 33.59 kg/m².  Falls risk      Past Medical History:   Diagnosis Date   • Disease of thyroid gland     Hypothyroid   • Hyperlipidemia     Controlled w/ SImvastatin   • Osteogenic sarcoma (CMS/HCC)     right leg     Past Surgical History:   Procedure Laterality Date   • ADENOIDECTOMY     • BELOW KNEE LEG AMPUTATION  1989    right leg   • SINUS SURGERY     • TONSILLECTOMY       Family History   Problem Relation Age of Onset   • Transient ischemic attack Mother    • Heart attack Brother    • Diabetes Brother    • Heart disease Brother         CAD w/ CABG     Social History     Tobacco Use   • Smoking status: Never Smoker   Substance Use Topics   • Alcohol use: Not Currently     Comment: rarely   • Drug use: Never     Medications Prior to Admission   Medication Sig Dispense Refill Last Dose   • aspirin 81 MG EC tablet Take 81 mg by mouth Daily.   4/8/2020 at Unknown time   • CBD oil (cannabidiol) capsule Take 1 capsule by mouth Daily.   4/8/2020 at Unknown time   • levothyroxine (SYNTHROID, LEVOTHROID) 125 MCG tablet Take 125 mcg by mouth Daily.   4/8/2020 at Unknown time   • loratadine (CLARITIN) 10 MG tablet Take 10 mg by mouth Daily.   4/8/2020 at Unknown time   • Multiple Vitamins-Minerals (MULTIVITAMIN WITH MINERALS) tablet tablet Take 1 tablet by mouth Daily.   4/8/2020 at Unknown time   • simvastatin (ZOCOR) 40 MG tablet Take 40 mg by mouth Every Night.   4/7/2020 at Unknown time   • vitamin D (ERGOCALCIFEROL) 1.25 MG (34837 UT) capsule capsule Take 50,000 Units by mouth Daily.   4/8/2020 at Unknown time   • vitamin E 1000 UNIT capsule Take 1,000  "Units by mouth Daily.   4/8/2020 at Unknown time   • gabapentin (NEURONTIN) 300 MG capsule Take 1 capsule by mouth 3 (Three) Times a Day. (Patient taking differently: Take 300 mg by mouth 3 (Three) Times a Day As Needed.) 90 capsule 0 More than a month at Unknown time   • HYDROcodone-acetaminophen (NORCO) 5-325 MG per tablet Take 1 tablet by mouth Every 4 (Four) Hours As Needed for Moderate Pain  for up to 15 doses. 15 tablet 0 More than a month at Unknown time   • LORazepam (ATIVAN) 1 MG tablet Take 1 tablet by mouth 2 (Two) Times a Day As Needed for Anxiety or Sleep. 15 tablet 0 More than a month at Unknown time     Allergies:  Latex      There is no immunization history on file for this patient.    REVIEW OF SYSTEMS:  Please see the above history of present illness for pertinent positives and negatives.  The remainder of the patient's systems have been reviewed and are negative.     Vital Signs  Temp:  [98.3 °F (36.8 °C)-98.8 °F (37.1 °C)] 98.3 °F (36.8 °C)  Heart Rate:  [69-70] 69  Resp:  [18-20] 20  BP: (142-156)/(76-92) 156/76  Flowsheet Rows      First Filed Value   Admission Height  170 cm (66.93\") Documented at 04/08/2020 1234   Admission Weight  97.1 kg (214 lb) Documented at 04/08/2020 1234           Body mass index is 33.66 kg/m².      Physical Exam   Constitutional: He is oriented to person, place, and time.   Patient obese/ Body mass index is 33.66 kg/m².     Cardiovascular: Normal rate and regular rhythm.   Pulmonary/Chest: Effort normal and breath sounds normal.   Abdominal: Soft. Bowel sounds are normal.   Musculoskeletal:   Pain right hip from fracture and AKA right   Neurological: He is alert and oriented to person, place, and time.   Skin: Skin is warm and dry.   Psychiatric: He has a normal mood and affect. His behavior is normal. Judgment and thought content normal.   Nursing note and vitals reviewed.      Emotional Behavior:    Judgement and Insight: good   Mental Status:  Alertness  " good   Memory:  good   Mood and Affect:         Depression  none               Anxiety  none    Debilities:   Physical Weakness  Yes from pain   Handicaps  Yes right AKA   Disabilities  Yes Right AKA   Agitation  no       Results Review:    I reviewed the patient's new clinical results.  Lab Results (most recent)     Procedure Component Value Units Date/Time    T4, Free [[id number]]  (Normal) Collected:  04/08/20 1336    Specimen:  Blood Updated:  04/08/20 1524     Free T4 1.53 ng/dL     Narrative:       Results may be falsely increased if patient taking Biotin.      TSH [[id number]]  (Normal) Collected:  04/08/20 1336    Specimen:  Blood Updated:  04/08/20 1524     TSH 1.240 uIU/mL     [place] Draw [[id number]] Collected:  04/08/20 1336    Specimen:  Blood Updated:  04/08/20 1446    Narrative:       The following orders were created for panel order [place] Draw.  Procedure                               Abnormality         Status                     ---------                               -----------         ------                     Light Blue Top[[id number]]                                   Final result               Green Top (Gel)[[id number]]                                  Final result               Lavender Top[[id number]]                                     Final result               Gold Top - SST[[id number]]                                   Final result                 Please view results for these tests on the individual orders.    Light Blue Top [[id number]] Collected:  04/08/20 1336    Specimen:  Blood Updated:  04/08/20 1446     Extra Tube hold for add-on     Comment: Auto resulted       Green Top (Gel) [[id number]] Collected:  04/08/20 1336    Specimen:  Blood Updated:  04/08/20 1446     Extra Tube Hold for add-ons.     Comment: Auto resulted.       Lavender Top [[id number]] Collected:  04/08/20 1336    Specimen:  Blood Updated:  04/08/20 1446     Extra Tube hold for add-on     Comment: Auto resulted        Gold Top - SST [[id number]] Collected:  04/08/20 1336    Specimen:  Blood Updated:  04/08/20 1446     Extra Tube Hold for add-ons.     Comment: Auto resulted.       Comprehensive Metabolic Panel [[id number]]  (Abnormal) Collected:  04/08/20 1336    Specimen:  Blood Updated:  04/08/20 1406     Glucose 100 mg/dL      BUN 15 mg/dL      Creatinine 1.21 mg/dL      Sodium 137 mmol/L      Potassium 4.1 mmol/L      Chloride 105 mmol/L      CO2 20.0 mmol/L      Calcium 8.9 mg/dL      Total Protein 6.6 g/dL      Albumin 3.80 g/dL      ALT (SGPT) 48 U/L      AST (SGOT) 34 U/L      Comment: Specimen hemolyzed.  Results may be affected.        Alkaline Phosphatase 81 U/L      Total Bilirubin 1.2 mg/dL      eGFR Non African Amer 62 mL/min/1.73      Globulin 2.8 gm/dL      A/G Ratio 1.4 g/dL      BUN/Creatinine Ratio 12.4     Anion Gap 12.0 mmol/L     Narrative:       GFR Normal >60  Chronic Kidney Disease <60  Kidney Failure <15      CBC & Differential [[id number]] Collected:  04/08/20 1336    Specimen:  Blood Updated:  04/08/20 1353    Narrative:       The following orders were created for panel order CBC & Differential.  Procedure                               Abnormality         Status                     ---------                               -----------         ------                     CBC Auto Differential[[id number]]        Abnormal            Final result                 Please view results for these tests on the individual orders.    CBC Auto Differential [[id number]]  (Abnormal) Collected:  04/08/20 1336    Specimen:  Blood Updated:  04/08/20 1353     WBC 8.37 10*3/mm3      RBC 5.08 10*6/mm3      Hemoglobin 16.2 g/dL      Hematocrit 47.7 %      MCV 93.9 fL      MCH 31.9 pg      MCHC 34.0 g/dL      RDW 11.8 %      RDW-SD 40.8 fl      MPV 9.8 fL      Platelets 263 10*3/mm3      Neutrophil % 73.3 %      Lymphocyte % 15.7 %      Monocyte % 7.5 %      Eosinophil % 2.6 %      Basophil % 0.5 %      Immature Grans % 0.4 %       Neutrophils, Absolute 6.14 10*3/mm3      Lymphocytes, Absolute 1.31 10*3/mm3      Monocytes, Absolute 0.63 10*3/mm3      Eosinophils, Absolute 0.22 10*3/mm3      Basophils, Absolute 0.04 10*3/mm3      Immature Grans, Absolute 0.03 10*3/mm3           Imaging Results (Most Recent)     Procedure Component Value Units Date/Time    CT Lower Extremity Right Without Contrast [[id number]] Collected:  04/08/20 1536     Updated:  04/08/20 1539    Narrative:       CT LE RT WO    INDICATION:    60-year-old male with a history of intertrochanteric hip fracture. 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 Fell today and has severe right limb and back pain.    TECHNIQUE:   CT of the right lower extremity without IV contrast. Coronal and sagittal reconstructions were obtained.  Radiation dose reduction techniques included automated exposure control or exposure modulation based on body size. Count of known CT and cardiac nuc  med studies performed in previous 12 months: 1.      COMPARISON:    None available.    FINDINGS:  The included bowel is unremarkable and the appendix is normal. The bladder and prostate gland appear within normal limits. There is no inguinal adenopathy or drainable fluid collection.    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 There is no  evidence of acetabular fracture. There is a complete oblique intertrochanteric fracture of the right hip. The fracture line is best demonstrated on the coronal reformats. There is mild medial displacement of the lesser trochanter medially and anteriorly  a distance of about 9 mm. No evidence of hip dislocation. There is edematous change of the thigh musculature on the right related to the fracture and displacement of the lesser trochanter.      Impression:          1. Complete intertrochanteric fracture of the right hip with displacement of the lesser trochanter medially and anteriorly a distance of up to about 9 mm. Edematous change of the anterior thigh musculature related to the fracture.  2. No evidence of dislocation or distinct acetabular fracture.  3. 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    Signer Name: Tim Keen MD   Signed: 4/8/2020 3:36 PM   Workstation Name: [id number]    [facility]    XR Chest 1 View [[id number]] Collected:  04/08/20 1520     Updated:  04/08/20 1526    Narrative:       Chest x-ray portable    INDICATION: Right hip fracture, preop for surgery in a.m.    FINDINGS: Single frontal portable view the chest timed 4/8/2020 is submitted for review. Comparison chest x-ray is from 12/25/2019. There is a small amount of linear airspace disease/atelectasis at the left base. Lungs are otherwise clear. The right  perihilar opacity noted previously has resolved. There is no congestive failure. There is no pleural effusion or pneumothorax. The heart size is top normal.      Impression:       1. Small amount of linear airspace disease or atelectasis left base, otherwise no active disease.    Signer Name: Sabrina Hudson MD   Signed: 4/8/2020 3:20 PM   [id number]    [facility]    XR Spine Lumbar Complete 4+VW [[id number]] Collected:  04/08/20 1332     Updated:  04/08/20 1335    Narrative:       XR SPINE LUMBAR COMPLETE 4+VW-: 4/8/2020 12:52 PM     INDICATION:   60-year-old male with a history of osteosarcoma and right leg amputation  in 1989. Fell today and has severe right mammogram and back pain.     COMPARISON:   None available.     FINDINGS:  5 views of the lumbar spine. Vertebral body heights and alignment are  preserved. No acute fracture. No pars defect. No significant  degenerative change..       Impression:       Negative lumbar spine.     This  report was finalized on 4/8/2020 1:33 PM by Dr. Tim Keen MD.       XR Hip With or Without Pelvis 2 - 3 View Right [[id number]] Collected:  04/08/20 1328     Updated:  04/08/20 1334    Narrative:       XR HIP W OR WO PELVIS 2-3 VIEW RIGHT-: 4/8/2020 12:53 PM     INDICATION:   60-year-old male with a history of right leg amputation in 1989  suffering a fall today. Severe right limb and back pain .     COMPARISON:   None available.     FINDINGS:  AP and frog-leg lateral view(s) of the right hip.  There is asymmetric  osteoporosis of the right hemipelvis and right hip, likely related to  osteoporosis of disuse. There is an oblique likely complete  intertrochanteric fracture of the right hip. There is displacement of  the lesser trochanter medially a distance of about 7 mm.  Status post  amputation of the right femur distal to the proximal third shaft.         Impression:          1. Oblique and trochanteric fracture of the right hip, likely complete  in nature. Mild displacement of the lesser trochanter medially 7 mm.     This report was finalized on 4/8/2020 1:31 PM by Dr. Tim Keen MD.           Images not reviewed    ECG/EMG Results (most recent)     Procedure Component Value Units Date/Time    ECG 12 Lead [[id number]] Collected:  04/08/20 1412     Updated:  04/08/20 1429    Narrative:       HEART RATE= 81  bpm  RR Interval= 744  ms  IL Interval= 162  ms  P Horizontal Axis= 15  deg  P Front Axis= 32  deg  QRSD Interval= 92  ms  QT Interval= 381  ms  QRS Axis= 31  deg  T Wave Axis= 29  deg  - NORMAL ECG -  Sinus rhythm  NO PRIOR TRACING AVAILABLE FOR COMPARISON  Electronically Signed By: Jose Slater ([facility]) 08-Apr-2020 14:29:34  Date and Time of Study: 2020-04-08 14:12:02        Images not reviewed    Assessment/Plan     Right intertrochanteric fracture    Falls risk    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     Myriam Berumen DO  04/08/20  16:16    Time: 50 min

## 2020-04-08 NOTE — ED PROVIDER NOTES
EMERGENCY DEPARTMENT ENCOUNTER      Room Number: 2/02    History is provided by the patient, no translation services needed    HPI:    Chief complaint: Fall, hip injury, back pain    Location: Right hip, lumbar    Quality/Severity: 5/10 at rest, 8/10 with any movement    Timing/Duration: Injury occurred just prior to arrival today.    Modifying Factors: Pain increases with movement.    Associated Symptoms: Positive for right hip and lower back pain.  Patient denies any head injury or loss of consciousness.  He denies any other injuries.    Narrative: Pt is a 60 y.o. male who presents complaining of ground-level fall just prior to arrival today.  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  Patient states this morning he was ambulating with crutches without his prosthesis, and slipped on linoleum floor landing on his right hip.  He states he can typically move his stump and has full range of motion, he is unable to move it at all at this point.  He states he has normal sensation.  He does have a prosthesis that he usually wears, but states he was not wearing it today.  He also reports lumbar pain. Patient also has a PMH significant for hyperlipidemia and hypothyroidism.      PMD: Kvng Hernandez MD    REVIEW OF SYSTEMS  Review of Systems   Constitutional: Negative for chills and fever.   Respiratory: Negative for cough and shortness of breath.    Cardiovascular: Negative for chest pain and palpitations.   Gastrointestinal: Negative for nausea and vomiting.   Musculoskeletal: Positive for arthralgias and back pain. Negative for myalgias.   Skin: Negative for pallor and wound.   Neurological: Negative for dizziness, syncope and headaches.   Psychiatric/Behavioral: Negative for confusion. The patient is not nervous/anxious.          PAST MEDICAL HISTORY  Active Ambulatory Problems     Diagnosis Date Noted   • Intertrochanteric fracture of  right hip (CMS/HCC) 04/08/2020     Resolved Ambulatory Problems     Diagnosis Date Noted   • No Resolved Ambulatory Problems     Past Medical History:   Diagnosis Date   • Disease of thyroid gland    • Hyperlipidemia    • Osteogenic sarcoma (CMS/HCC)        PAST SURGICAL HISTORY  Past Surgical History:   Procedure Laterality Date   • ADENOIDECTOMY     • BELOW KNEE LEG AMPUTATION  1989    right leg   • SINUS SURGERY     • TONSILLECTOMY         FAMILY HISTORY  Family History   Problem Relation Age of Onset   • Transient ischemic attack Mother    • Heart attack Brother    • Diabetes Brother    • Heart disease Brother         CAD w/ CABG       SOCIAL HISTORY  Social History     Socioeconomic History   • Marital status:      Spouse name: Not on file   • Number of children: Not on file   • Years of education: Not on file   • Highest education level: Not on file   Tobacco Use   • Smoking status: Never Smoker   Substance and Sexual Activity   • Alcohol use: Not Currently     Comment: rarely   • Drug use: Never   • Sexual activity: Defer       ALLERGIES  Latex      Current Facility-Administered Medications:   •  HYDROmorphone (DILAUDID) injection 0.5 mg, 0.5 mg, Intravenous, Q2H PRN, Jorge Wilcox MD  •  [COMPLETED] Insert peripheral IV, , , Once **AND** sodium chloride 0.9 % flush 10 mL, 10 mL, Intravenous, PRN, Anthony Gamble MD    Current Outpatient Medications:   •  levothyroxine (SYNTHROID, LEVOTHROID) 125 MCG tablet, Take 125 mcg by mouth Daily., Disp: , Rfl:   •  Multiple Vitamins-Minerals (MULTIVITAMIN WITH MINERALS) tablet tablet, Take 1 tablet by mouth Daily., Disp: , Rfl:   •  simvastatin (ZOCOR) 40 MG tablet, Take 40 mg by mouth Every Night., Disp: , Rfl:   •  gabapentin (NEURONTIN) 300 MG capsule, Take 1 capsule by mouth 3 (Three) Times a Day., Disp: 90 capsule, Rfl: 0  •  HYDROcodone-acetaminophen (NORCO) 5-325 MG per tablet, Take 1 tablet by mouth Every 4 (Four) Hours As Needed for Moderate  Pain  for up to 15 doses., Disp: 15 tablet, Rfl: 0  •  LORazepam (ATIVAN) 1 MG tablet, Take 1 tablet by mouth 2 (Two) Times a Day As Needed for Anxiety or Sleep., Disp: 15 tablet, Rfl: 0    PHYSICAL EXAM  ED Triage Vitals [04/08/20 1234]   Temp Heart Rate Resp BP SpO2   98.8 °F (37.1 °C) 69 20 142/87 97 %      Temp src Heart Rate Source Patient Position BP Location FiO2 (%)   Oral Monitor Lying Right arm --       Physical Exam   Constitutional: He is oriented to person, place, and time and well-developed, well-nourished, and in no distress.   HENT:   Head: Normocephalic and atraumatic.   Mouth/Throat: Uvula is midline and mucous membranes are normal.   Eyes: Pupils are equal, round, and reactive to light. Conjunctivae are normal.   Neck: Normal range of motion. Neck supple.   Cardiovascular: Normal rate, regular rhythm and intact distal pulses.   Pulmonary/Chest: Effort normal and breath sounds normal.   Abdominal: Soft. Bowel sounds are normal.   Musculoskeletal:        Right hip: He exhibits decreased range of motion and bony tenderness.        Lumbar back: He exhibits bony tenderness.   Amputation at mid/proximal femur.     Neurological: He is alert and oriented to person, place, and time. GCS score is 15.   Skin: Skin is warm and dry.   Psychiatric: Mood, memory, affect and judgment normal.   Nursing note and vitals reviewed.        LAB RESULTS  Results for orders placed or performed during the hospital encounter of 04/08/20   Comprehensive Metabolic Panel   Result Value Ref Range    Glucose 100 (H) 65 - 99 mg/dL    BUN 15 6 - 20 mg/dL    Creatinine 1.21 0.76 - 1.27 mg/dL    Sodium 137 136 - 145 mmol/L    Potassium 4.1 3.5 - 5.2 mmol/L    Chloride 105 98 - 107 mmol/L    CO2 20.0 (L) 22.0 - 29.0 mmol/L    Calcium 8.9 8.6 - 10.5 mg/dL    Total Protein 6.6 6.0 - 8.5 g/dL    Albumin 3.80 3.50 - 5.20 g/dL    ALT (SGPT) 48 (H) 1 - 41 U/L    AST (SGOT) 34 1 - 40 U/L    Alkaline Phosphatase 81 39 - 117 U/L    Total  Bilirubin 1.2 0.2 - 1.2 mg/dL    eGFR Non African Amer 62 >60 mL/min/1.73    Globulin 2.8 gm/dL    A/G Ratio 1.4 g/dL    BUN/Creatinine Ratio 12.4 7.0 - 25.0    Anion Gap 12.0 5.0 - 15.0 mmol/L   CBC Auto Differential   Result Value Ref Range    WBC 8.37 3.40 - 10.80 10*3/mm3    RBC 5.08 4.14 - 5.80 10*6/mm3    Hemoglobin 16.2 13.0 - 17.7 g/dL    Hematocrit 47.7 37.5 - 51.0 %    MCV 93.9 79.0 - 97.0 fL    MCH 31.9 26.6 - 33.0 pg    MCHC 34.0 31.5 - 35.7 g/dL    RDW 11.8 (L) 12.3 - 15.4 %    RDW-SD 40.8 37.0 - 54.0 fl    MPV 9.8 6.0 - 12.0 fL    Platelets 263 140 - 450 10*3/mm3    Neutrophil % 73.3 42.7 - 76.0 %    Lymphocyte % 15.7 (L) 19.6 - 45.3 %    Monocyte % 7.5 5.0 - 12.0 %    Eosinophil % 2.6 0.3 - 6.2 %    Basophil % 0.5 0.0 - 1.5 %    Immature Grans % 0.4 0.0 - 0.5 %    Neutrophils, Absolute 6.14 1.70 - 7.00 10*3/mm3    Lymphocytes, Absolute 1.31 0.70 - 3.10 10*3/mm3    Monocytes, Absolute 0.63 0.10 - 0.90 10*3/mm3    Eosinophils, Absolute 0.22 0.00 - 0.40 10*3/mm3    Basophils, Absolute 0.04 0.00 - 0.20 10*3/mm3    Immature Grans, Absolute 0.03 0.00 - 0.05 10*3/mm3   Light Blue Top   Result Value Ref Range    Extra Tube hold for add-on    Green Top (Gel)   Result Value Ref Range    Extra Tube Hold for add-ons.    Lavender Top   Result Value Ref Range    Extra Tube hold for add-on    Gold Top - SST   Result Value Ref Range    Extra Tube Hold for add-ons.          I ordered the above labs and reviewed the results    RADIOLOGY  Xr Spine Lumbar Complete 4+vw    Result Date: 4/8/2020  Narrative: XR SPINE LUMBAR COMPLETE 4+VW-: 4/8/2020 12:52 PM  INDICATION: 60-year-old male with a history of osteosarcoma and right leg amputation in 1989. Fell today and has severe right mammogram and back pain.  COMPARISON: None available.  FINDINGS: 5 views of the lumbar spine. Vertebral body heights and alignment are preserved. No acute fracture. No pars defect. No significant degenerative change..      Impression: Negative  lumbar spine.  This report was finalized on 4/8/2020 1:33 PM by Dr. iTm Keen MD.      Xr Hip With Or Without Pelvis 2 - 3 View Right    Result Date: 4/8/2020  Narrative: XR HIP W OR WO PELVIS 2-3 VIEW RIGHT-: 4/8/2020 12:53 PM  INDICATION: 60-year-old male with a history of right leg amputation in 1989 suffering a fall today. Severe right limb and back pain .  COMPARISON: None available.  FINDINGS: AP and frog-leg lateral view(s) of the right hip.  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 There is an oblique likely complete intertrochanteric fracture of the right hip. There is displacement of the lesser trochanter medially a distance of about 7 mm.  Status post amputation of the right femur distal to the proximal third shaft.       Impression:  1. Oblique and trochanteric fracture of the right hip, likely complete in nature. Mild displacement of the lesser trochanter medially 7 mm.  This report was finalized on 4/8/2020 1:31 PM by Dr. Tim Keen MD.        I ordered the above radiologic testing and reviewed the results    PROCEDURES  Procedures      PROGRESS AND CONSULTS  ED Course as of Apr 08 1448   Wed Apr 08, 2020   1359 CONSULT  Dr. Wilcox at bedside discussing surgical options with patient.  He is planning for surgery tomorrow.  He asked that we admit to hospitalist.  He also recommends we order CT of the right hip with 3D reconstruction.    [KS]   1420 EKG         EKG time / Interpretation time: 1412/1415  Rhythm/Rate: Sinus rhythm, 81   NY: 162  QRS, axis: 31  QTc 442  ST and T waves: There is no significant ST elevation or depression, T wave flattening in lead III.  EKG Tracing Interpreted Contemporaneously by me, independently viewed by me and MD.  No prior EKG with which to compare.      [KS]   1421 CONSULT  Discussed case with Dr Berumen, hospitalist.  Reviewed history, exam, results and treatments.  Discussed concerns and  plan of care. Dr Berumen accepts pt to be admitted to [facility] for observation.        [KS]      ED Course User Index  [KS] Brigida Garcia PA-C           MEDICAL DECISION MAKING  Results were reviewed/discussed with the patient and they were also made aware of online access. Pt also made aware that some labs, such as cultures, will not be resulted during ER visit and follow up with PMD is necessary.     MDM        My diagnosis for lower extremity pain and injury includes but is not limited to hip fracture, femur fracture, hip dislocation, hip contusion, hip sprain, hip strain, pelvic fracture, knee sprain, patella dislocation, knee dislocation, internal derangement of knee, fractures of the femur, tibia, fibula, ankle, foot and digits, ankle sprain, ankle dislocation, Lisfranc fracture, fracture dislocations of the digits, pulmonary embolism, claudication, peripheral vascular disease, gout, osteoarthritis, rheumatoid arthritis, bursitis, septic joint, poly-rheumatica, polyarthralgia and other inflammatory or infectious disease processes.      DIAGNOSIS  Final diagnoses:   Closed nondisplaced intertrochanteric fracture of right femur, initial encounter (CMS/[facility])       Latest Documented Vital Signs:  As of 14:48  BP- 152/92 HR- 70 Temp- 98.8 °F (37.1 °C) (Oral) O2 sat- 93%    DISPOSITION  Patient admitted to [facility].         Medication List      No changes were made to your prescriptions during this visit.                 Dictated utilizing Dragon dictation       Brigida Garcia PA-C  04/08/20 9042

## 2020-04-08 NOTE — ED NOTES
"Shahnaz, registration staff, informed this RN that pt's wife called and asked that pt not receive \"any immunizations\"     Era Capellan, RN  04/08/20 9909    "

## 2020-04-08 NOTE — PLAN OF CARE
Problem: Patient Care Overview  Goal: Plan of Care Review  Outcome: Ongoing (interventions implemented as appropriate)  Flowsheets (Taken 4/8/2020 1710)  Progress: no change  Plan of Care Reviewed With: patient  Outcome Summary: 60 yr. old admitted from ER with Rt. hip Fracture(AKA),pain control with meds, OR consent signed,NPO  after MN

## 2020-04-08 NOTE — ED NOTES
Call placed to Dr. Wilcox's office. CHINO Barrera Spoke with Dr. Wilcox. Call complete.      Rose Mary Mcfadden  04/08/20 1207

## 2020-04-09 ENCOUNTER — ANESTHESIA EVENT (OUTPATIENT)
Dept: PERIOP | Facility: HOSPITAL | Age: 60
End: 2020-04-09

## 2020-04-09 ENCOUNTER — APPOINTMENT (OUTPATIENT)
Dept: GENERAL RADIOLOGY | Facility: HOSPITAL | Age: 60
End: 2020-04-09

## 2020-04-09 ENCOUNTER — ANESTHESIA (OUTPATIENT)
Dept: PERIOP | Facility: HOSPITAL | Age: 60
End: 2020-04-09

## 2020-04-09 PROCEDURE — 25010000002 DEXAMETHASONE PER 1 MG: Performed by: NURSE ANESTHETIST, CERTIFIED REGISTERED

## 2020-04-09 PROCEDURE — 25010000002 SUCCINYLCHOLINE PER 20 MG: Performed by: NURSE ANESTHETIST, CERTIFIED REGISTERED

## 2020-04-09 PROCEDURE — 97165 OT EVAL LOW COMPLEX 30 MIN: CPT

## 2020-04-09 PROCEDURE — 25010000003 CEFAZOLIN SODIUM-DEXTROSE 2-3 GM-%(50ML) RECONSTITUTED SOLUTION: Performed by: ORTHOPAEDIC SURGERY

## 2020-04-09 PROCEDURE — 97161 PT EVAL LOW COMPLEX 20 MIN: CPT

## 2020-04-09 PROCEDURE — 99231 SBSQ HOSP IP/OBS SF/LOW 25: CPT | Performed by: HOSPITALIST

## 2020-04-09 PROCEDURE — 94799 UNLISTED PULMONARY SVC/PX: CPT

## 2020-04-09 PROCEDURE — C1713 ANCHOR/SCREW BN/BN,TIS/BN: HCPCS | Performed by: ORTHOPAEDIC SURGERY

## 2020-04-09 PROCEDURE — 25010000002 VANCOMYCIN 1 G RECONSTITUTED SOLUTION: Performed by: ORTHOPAEDIC SURGERY

## 2020-04-09 PROCEDURE — 76000 FLUOROSCOPY <1 HR PHYS/QHP: CPT

## 2020-04-09 PROCEDURE — 25010000002 ROPIVACAINE PER 1 MG: Performed by: NURSE ANESTHETIST, CERTIFIED REGISTERED

## 2020-04-09 PROCEDURE — C9290 INJ, BUPIVACAINE LIPOSOME: HCPCS | Performed by: ORTHOPAEDIC SURGERY

## 2020-04-09 PROCEDURE — 25010000003 BUPIVACAINE LIPOSOME 1.3 % SUSPENSION: Performed by: ORTHOPAEDIC SURGERY

## 2020-04-09 PROCEDURE — 25010000002 ONDANSETRON PER 1 MG: Performed by: NURSE ANESTHETIST, CERTIFIED REGISTERED

## 2020-04-09 PROCEDURE — 25010000002 MIDAZOLAM PER 1MG: Performed by: NURSE ANESTHETIST, CERTIFIED REGISTERED

## 2020-04-09 PROCEDURE — 25010000002 PROPOFOL 10 MG/ML EMULSION: Performed by: NURSE ANESTHETIST, CERTIFIED REGISTERED

## 2020-04-09 PROCEDURE — 25010000002 HYDROMORPHONE 1 MG/ML SOLUTION: Performed by: NURSE ANESTHETIST, CERTIFIED REGISTERED

## 2020-04-09 PROCEDURE — 73502 X-RAY EXAM HIP UNI 2-3 VIEWS: CPT

## 2020-04-09 PROCEDURE — 25010000002 FENTANYL CITRATE (PF) 100 MCG/2ML SOLUTION: Performed by: NURSE ANESTHETIST, CERTIFIED REGISTERED

## 2020-04-09 PROCEDURE — 27244 TREAT THIGH FRACTURE: CPT | Performed by: ORTHOPAEDIC SURGERY

## 2020-04-09 PROCEDURE — 0QS604Z REPOSITION RIGHT UPPER FEMUR WITH INTERNAL FIXATION DEVICE, OPEN APPROACH: ICD-10-PCS | Performed by: ORTHOPAEDIC SURGERY

## 2020-04-09 DEVICE — IMPLANTABLE DEVICE: Type: IMPLANTABLE DEVICE | Site: HIP | Status: FUNCTIONAL

## 2020-04-09 DEVICE — STANDARD LAG SCREW
Type: IMPLANTABLE DEVICE | Site: HIP | Status: FUNCTIONAL
Brand: OMEGA

## 2020-04-09 DEVICE — DEV CONTRL TISS STRATAFIX SPIRAL MNCRYL UD 3/0 PLS 45CM: Type: IMPLANTABLE DEVICE | Site: HIP | Status: FUNCTIONAL

## 2020-04-09 DEVICE — STANDARD BARREL HIP PLATE; KEYLESS
Type: IMPLANTABLE DEVICE | Site: HIP | Status: FUNCTIONAL
Brand: OMEGA

## 2020-04-09 DEVICE — COMPRESSION SCREW
Type: IMPLANTABLE DEVICE | Site: HIP | Status: FUNCTIONAL
Brand: OMEGA

## 2020-04-09 RX ORDER — CEFAZOLIN SODIUM 2 G/50ML
2 SOLUTION INTRAVENOUS ONCE
Status: COMPLETED | OUTPATIENT
Start: 2020-04-09 | End: 2020-04-09

## 2020-04-09 RX ORDER — ASPIRIN 325 MG
325 TABLET, DELAYED RELEASE (ENTERIC COATED) ORAL DAILY
Status: DISCONTINUED | OUTPATIENT
Start: 2020-04-10 | End: 2020-04-10 | Stop reason: HOSPADM

## 2020-04-09 RX ORDER — BACITRACIN ZINC 500 [USP'U]/G
OINTMENT TOPICAL AS NEEDED
Status: DISCONTINUED | OUTPATIENT
Start: 2020-04-09 | End: 2020-04-09 | Stop reason: HOSPADM

## 2020-04-09 RX ORDER — ONDANSETRON 2 MG/ML
4 INJECTION INTRAMUSCULAR; INTRAVENOUS ONCE AS NEEDED
Status: DISCONTINUED | OUTPATIENT
Start: 2020-04-09 | End: 2020-04-09 | Stop reason: HOSPADM

## 2020-04-09 RX ORDER — MAGNESIUM HYDROXIDE 1200 MG/15ML
LIQUID ORAL AS NEEDED
Status: DISCONTINUED | OUTPATIENT
Start: 2020-04-09 | End: 2020-04-09 | Stop reason: HOSPADM

## 2020-04-09 RX ORDER — MIDAZOLAM HYDROCHLORIDE 2 MG/2ML
1 INJECTION, SOLUTION INTRAMUSCULAR; INTRAVENOUS
Status: DISCONTINUED | OUTPATIENT
Start: 2020-04-09 | End: 2020-04-09 | Stop reason: HOSPADM

## 2020-04-09 RX ORDER — EPHEDRINE SULFATE 50 MG/ML
INJECTION, SOLUTION INTRAVENOUS AS NEEDED
Status: DISCONTINUED | OUTPATIENT
Start: 2020-04-09 | End: 2020-04-09 | Stop reason: SURG

## 2020-04-09 RX ORDER — ONDANSETRON 2 MG/ML
4 INJECTION INTRAMUSCULAR; INTRAVENOUS ONCE AS NEEDED
Status: COMPLETED | OUTPATIENT
Start: 2020-04-09 | End: 2020-04-09

## 2020-04-09 RX ORDER — PREGABALIN 75 MG/1
75 CAPSULE ORAL ONCE
Status: COMPLETED | OUTPATIENT
Start: 2020-04-09 | End: 2020-04-09

## 2020-04-09 RX ORDER — CEFAZOLIN SODIUM 2 G/50ML
2 SOLUTION INTRAVENOUS EVERY 8 HOURS
Status: COMPLETED | OUTPATIENT
Start: 2020-04-09 | End: 2020-04-10

## 2020-04-09 RX ORDER — OXYCODONE AND ACETAMINOPHEN 7.5; 325 MG/1; MG/1
2 TABLET ORAL EVERY 4 HOURS PRN
Status: DISCONTINUED | OUTPATIENT
Start: 2020-04-09 | End: 2020-04-10 | Stop reason: HOSPADM

## 2020-04-09 RX ORDER — VANCOMYCIN HYDROCHLORIDE 1 G/20ML
INJECTION, POWDER, LYOPHILIZED, FOR SOLUTION INTRAVENOUS AS NEEDED
Status: DISCONTINUED | OUTPATIENT
Start: 2020-04-09 | End: 2020-04-09 | Stop reason: HOSPADM

## 2020-04-09 RX ORDER — SUCCINYLCHOLINE CHLORIDE 20 MG/ML
INJECTION INTRAMUSCULAR; INTRAVENOUS AS NEEDED
Status: DISCONTINUED | OUTPATIENT
Start: 2020-04-09 | End: 2020-04-09 | Stop reason: SURG

## 2020-04-09 RX ORDER — FAMOTIDINE 10 MG/ML
20 INJECTION, SOLUTION INTRAVENOUS
Status: COMPLETED | OUTPATIENT
Start: 2020-04-09 | End: 2020-04-09

## 2020-04-09 RX ORDER — SODIUM CHLORIDE, SODIUM LACTATE, POTASSIUM CHLORIDE, CALCIUM CHLORIDE 600; 310; 30; 20 MG/100ML; MG/100ML; MG/100ML; MG/100ML
9 INJECTION, SOLUTION INTRAVENOUS CONTINUOUS
Status: DISCONTINUED | OUTPATIENT
Start: 2020-04-09 | End: 2020-04-10

## 2020-04-09 RX ORDER — SODIUM CHLORIDE, SODIUM LACTATE, POTASSIUM CHLORIDE, CALCIUM CHLORIDE 600; 310; 30; 20 MG/100ML; MG/100ML; MG/100ML; MG/100ML
30 INJECTION, SOLUTION INTRAVENOUS CONTINUOUS
Status: DISCONTINUED | OUTPATIENT
Start: 2020-04-09 | End: 2020-04-10

## 2020-04-09 RX ORDER — MEPERIDINE HYDROCHLORIDE 25 MG/ML
12.5 INJECTION INTRAMUSCULAR; INTRAVENOUS; SUBCUTANEOUS
Status: DISCONTINUED | OUTPATIENT
Start: 2020-04-09 | End: 2020-04-09 | Stop reason: HOSPADM

## 2020-04-09 RX ORDER — ACETAMINOPHEN 650 MG/1
650 SUPPOSITORY RECTAL ONCE AS NEEDED
Status: DISCONTINUED | OUTPATIENT
Start: 2020-04-09 | End: 2020-04-09 | Stop reason: HOSPADM

## 2020-04-09 RX ORDER — DIPHENHYDRAMINE HYDROCHLORIDE 50 MG/ML
12.5 INJECTION INTRAMUSCULAR; INTRAVENOUS
Status: DISCONTINUED | OUTPATIENT
Start: 2020-04-09 | End: 2020-04-09 | Stop reason: HOSPADM

## 2020-04-09 RX ORDER — ROPIVACAINE HYDROCHLORIDE 2 MG/ML
INJECTION, SOLUTION EPIDURAL; INFILTRATION; PERINEURAL
Status: COMPLETED | OUTPATIENT
Start: 2020-04-09 | End: 2020-04-09

## 2020-04-09 RX ORDER — ROCURONIUM BROMIDE 10 MG/ML
INJECTION, SOLUTION INTRAVENOUS AS NEEDED
Status: DISCONTINUED | OUTPATIENT
Start: 2020-04-09 | End: 2020-04-09 | Stop reason: SURG

## 2020-04-09 RX ORDER — ACETAMINOPHEN 325 MG/1
650 TABLET ORAL ONCE AS NEEDED
Status: DISCONTINUED | OUTPATIENT
Start: 2020-04-09 | End: 2020-04-09 | Stop reason: HOSPADM

## 2020-04-09 RX ORDER — PROPOFOL 10 MG/ML
VIAL (ML) INTRAVENOUS AS NEEDED
Status: DISCONTINUED | OUTPATIENT
Start: 2020-04-09 | End: 2020-04-09 | Stop reason: SURG

## 2020-04-09 RX ORDER — FENTANYL CITRATE 50 UG/ML
INJECTION, SOLUTION INTRAMUSCULAR; INTRAVENOUS AS NEEDED
Status: DISCONTINUED | OUTPATIENT
Start: 2020-04-09 | End: 2020-04-09 | Stop reason: SURG

## 2020-04-09 RX ORDER — ACETAMINOPHEN 500 MG
1000 TABLET ORAL ONCE
Status: COMPLETED | OUTPATIENT
Start: 2020-04-09 | End: 2020-04-09

## 2020-04-09 RX ORDER — OXYCODONE AND ACETAMINOPHEN 7.5; 325 MG/1; MG/1
1 TABLET ORAL ONCE AS NEEDED
Status: DISCONTINUED | OUTPATIENT
Start: 2020-04-09 | End: 2020-04-09 | Stop reason: HOSPADM

## 2020-04-09 RX ORDER — KETAMINE HYDROCHLORIDE 100 MG/ML
INJECTION INTRAMUSCULAR; INTRAVENOUS AS NEEDED
Status: DISCONTINUED | OUTPATIENT
Start: 2020-04-09 | End: 2020-04-09 | Stop reason: SURG

## 2020-04-09 RX ORDER — SODIUM CHLORIDE, SODIUM LACTATE, POTASSIUM CHLORIDE, CALCIUM CHLORIDE 600; 310; 30; 20 MG/100ML; MG/100ML; MG/100ML; MG/100ML
INJECTION, SOLUTION INTRAVENOUS CONTINUOUS PRN
Status: DISCONTINUED | OUTPATIENT
Start: 2020-04-09 | End: 2020-04-09 | Stop reason: SURG

## 2020-04-09 RX ORDER — MIDAZOLAM HYDROCHLORIDE 2 MG/2ML
2 INJECTION, SOLUTION INTRAMUSCULAR; INTRAVENOUS
Status: DISCONTINUED | OUTPATIENT
Start: 2020-04-09 | End: 2020-04-09 | Stop reason: HOSPADM

## 2020-04-09 RX ORDER — DEXAMETHASONE SODIUM PHOSPHATE 4 MG/ML
8 INJECTION, SOLUTION INTRA-ARTICULAR; INTRALESIONAL; INTRAMUSCULAR; INTRAVENOUS; SOFT TISSUE ONCE
Status: COMPLETED | OUTPATIENT
Start: 2020-04-09 | End: 2020-04-09

## 2020-04-09 RX ORDER — LIDOCAINE HYDROCHLORIDE 20 MG/ML
INJECTION, SOLUTION INFILTRATION; PERINEURAL AS NEEDED
Status: DISCONTINUED | OUTPATIENT
Start: 2020-04-09 | End: 2020-04-09 | Stop reason: SURG

## 2020-04-09 RX ADMIN — FENTANYL CITRATE 25 MCG: 50 INJECTION, SOLUTION INTRAMUSCULAR; INTRAVENOUS at 09:40

## 2020-04-09 RX ADMIN — FAMOTIDINE 20 MG: 10 INJECTION INTRAVENOUS at 09:24

## 2020-04-09 RX ADMIN — KETAMINE HYDROCHLORIDE 10 MG: 100 INJECTION INTRAMUSCULAR; INTRAVENOUS at 09:38

## 2020-04-09 RX ADMIN — Medication 1 TABLET: at 13:44

## 2020-04-09 RX ADMIN — SODIUM CHLORIDE, POTASSIUM CHLORIDE, SODIUM LACTATE AND CALCIUM CHLORIDE 30 ML/HR: 600; 310; 30; 20 INJECTION, SOLUTION INTRAVENOUS at 13:48

## 2020-04-09 RX ADMIN — LEVOTHYROXINE SODIUM 125 MCG: 125 TABLET ORAL at 13:43

## 2020-04-09 RX ADMIN — GABAPENTIN 300 MG: 300 CAPSULE ORAL at 20:58

## 2020-04-09 RX ADMIN — KETAMINE HYDROCHLORIDE 10 MG: 100 INJECTION INTRAMUSCULAR; INTRAVENOUS at 10:30

## 2020-04-09 RX ADMIN — ACETAMINOPHEN 1000 MG: 500 TABLET, FILM COATED ORAL at 08:36

## 2020-04-09 RX ADMIN — FENTANYL CITRATE 25 MCG: 50 INJECTION, SOLUTION INTRAMUSCULAR; INTRAVENOUS at 10:18

## 2020-04-09 RX ADMIN — MIDAZOLAM HYDROCHLORIDE 2 MG: 1 INJECTION, SOLUTION INTRAMUSCULAR; INTRAVENOUS at 09:24

## 2020-04-09 RX ADMIN — ROPIVACAINE HYDROCHLORIDE 10 ML: 2 INJECTION, SOLUTION EPIDURAL; INFILTRATION at 09:30

## 2020-04-09 RX ADMIN — DOCUSATE SODIUM 100 MG: 100 CAPSULE, LIQUID FILLED ORAL at 20:58

## 2020-04-09 RX ADMIN — DOCUSATE SODIUM 100 MG: 100 CAPSULE, LIQUID FILLED ORAL at 13:42

## 2020-04-09 RX ADMIN — ATORVASTATIN CALCIUM 20 MG: 20 TABLET, FILM COATED ORAL at 13:42

## 2020-04-09 RX ADMIN — SODIUM CHLORIDE 1000 MG: 9 INJECTION, SOLUTION INTRAVENOUS at 10:01

## 2020-04-09 RX ADMIN — PREGABALIN 75 MG: 75 CAPSULE ORAL at 08:36

## 2020-04-09 RX ADMIN — SUCCINYLCHOLINE CHLORIDE 140 MG: 20 INJECTION, SOLUTION INTRAMUSCULAR; INTRAVENOUS at 09:42

## 2020-04-09 RX ADMIN — KETAMINE HYDROCHLORIDE 10 MG: 100 INJECTION INTRAMUSCULAR; INTRAVENOUS at 11:06

## 2020-04-09 RX ADMIN — ROCURONIUM BROMIDE 2 MG: 10 INJECTION, SOLUTION INTRAVENOUS at 09:40

## 2020-04-09 RX ADMIN — LIDOCAINE HYDROCHLORIDE 80 MG: 20 INJECTION, SOLUTION INFILTRATION; PERINEURAL at 09:40

## 2020-04-09 RX ADMIN — CEFAZOLIN SODIUM 2 G: 2 SOLUTION INTRAVENOUS at 09:38

## 2020-04-09 RX ADMIN — OXYCODONE HYDROCHLORIDE AND ACETAMINOPHEN 2 TABLET: 7.5; 325 TABLET ORAL at 20:58

## 2020-04-09 RX ADMIN — FENTANYL CITRATE 25 MCG: 50 INJECTION, SOLUTION INTRAMUSCULAR; INTRAVENOUS at 11:25

## 2020-04-09 RX ADMIN — CEFAZOLIN SODIUM 2 G: 2 SOLUTION INTRAVENOUS at 16:47

## 2020-04-09 RX ADMIN — SODIUM CHLORIDE 1000 MG: 9 INJECTION, SOLUTION INTRAVENOUS at 10:54

## 2020-04-09 RX ADMIN — KETAMINE HYDROCHLORIDE 10 MG: 100 INJECTION INTRAMUSCULAR; INTRAVENOUS at 09:59

## 2020-04-09 RX ADMIN — PROPOFOL 150 MG: 10 INJECTION, EMULSION INTRAVENOUS at 09:42

## 2020-04-09 RX ADMIN — DEXAMETHASONE SODIUM PHOSPHATE 8 MG: 4 INJECTION, SOLUTION INTRAMUSCULAR; INTRAVENOUS at 09:24

## 2020-04-09 RX ADMIN — KETAMINE HYDROCHLORIDE 10 MG: 100 INJECTION INTRAMUSCULAR; INTRAVENOUS at 10:12

## 2020-04-09 RX ADMIN — ONDANSETRON 4 MG: 2 INJECTION, SOLUTION INTRAMUSCULAR; INTRAVENOUS at 09:24

## 2020-04-09 RX ADMIN — EPHEDRINE SULFATE 10 MG: 50 INJECTION, SOLUTION INTRAVENOUS at 10:32

## 2020-04-09 RX ADMIN — FENTANYL CITRATE 25 MCG: 50 INJECTION, SOLUTION INTRAMUSCULAR; INTRAVENOUS at 11:10

## 2020-04-09 RX ADMIN — SODIUM CHLORIDE, POTASSIUM CHLORIDE, SODIUM LACTATE AND CALCIUM CHLORIDE: 600; 310; 30; 20 INJECTION, SOLUTION INTRAVENOUS at 09:35

## 2020-04-09 RX ADMIN — GABAPENTIN 300 MG: 300 CAPSULE ORAL at 16:47

## 2020-04-09 RX ADMIN — EPHEDRINE SULFATE 10 MG: 50 INJECTION, SOLUTION INTRAVENOUS at 10:04

## 2020-04-09 RX ADMIN — HYDROMORPHONE HYDROCHLORIDE 1 MG: 1 INJECTION, SOLUTION INTRAMUSCULAR; INTRAVENOUS; SUBCUTANEOUS at 12:46

## 2020-04-09 RX ADMIN — Medication 10 ML: at 21:16

## 2020-04-09 NOTE — PAYOR COMM NOTE
"Marissa Solano (60 y.o. Male)   ATTN: NURSE REVIEWER  RE: INPATIENT AUTH REQUEST  REF#[id number]  PLS REPLY TO ANDREA MIRANDA [phone] FAX# [phone]      Date [id number] Birth Social Security Number Address Home Phone MRN    1960  [address]  [place] [address] [phone] [id number]    [employer] Marital Status          Faith        Admission Date Admission Type Admitting Provider Attending Provider Department, Room/Bed    4/8/20 Emergency Myriam Berumen DO Ringswald, Madonna, DO [facility] OR, [facility] OR/OR    Discharge Date Discharge Disposition Discharge Destination                       Attending Provider:  Myriam Berumen DO    Allergies:  Latex    Isolation:  None   Infection:  None   Code Status:  CPR    Ht:  170.2 cm (67\")   Wt:  97.5 kg (214 lb 14.4 oz)    Admission Cmt:  None   Principal Problem:  None                Active Insurance as of 4/8/2020     Primary Coverage     Payor Plan Insurance Group Employer/Plan Group    ANTHEM BLUE CROSS ANTHEM BLUE CROSS BLUE SHIELD PPO [id number]     Payor Plan Address Payor Plan Phone Number Payor Plan Fax Number Effective Dates    [address] [phone]  1/1/2017 - None Entered    [place] [address]       Subscriber Name Subscriber Birth Date Member ID       MARISSA SOLANO 1960 [id number]                 Emergency Contacts      (Rel.) Home Phone Work Phone Mobile Phone    Savanah Solano (Spouse) [phone] -- [phone]    moiseschau (Daughter) [phone] -- --               History & Physical      Myriam Berumen DO at 04/08/20 1616          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 Cobian  Labs ok  CT of hip.    Med surg/ no " tele  Obs  Body mass index is 33.59 kg/m².  Falls risk      Past Medical History:   Diagnosis Date   • Disease of thyroid gland     Hypothyroid   • Hyperlipidemia     Controlled w/ SImvastatin   • Osteogenic sarcoma (CMS/HCC)     right leg     Past Surgical History:   Procedure Laterality Date   • ADENOIDECTOMY     • BELOW KNEE LEG AMPUTATION  1989    right leg   • SINUS SURGERY     • TONSILLECTOMY       Family History   Problem Relation Age of Onset   • Transient ischemic attack Mother    • Heart attack Brother    • Diabetes Brother    • Heart disease Brother         CAD w/ CABG     Social History     Tobacco Use   • Smoking status: Never Smoker   Substance Use Topics   • Alcohol use: Not Currently     Comment: rarely   • Drug use: Never     Medications Prior to Admission   Medication Sig Dispense Refill Last Dose   • aspirin 81 MG EC tablet Take 81 mg by mouth Daily.   4/8/2020 at Unknown time   • CBD oil (cannabidiol) capsule Take 1 capsule by mouth Daily.   4/8/2020 at Unknown time   • levothyroxine (SYNTHROID, LEVOTHROID) 125 MCG tablet Take 125 mcg by mouth Daily.   4/8/2020 at Unknown time   • loratadine (CLARITIN) 10 MG tablet Take 10 mg by mouth Daily.   4/8/2020 at Unknown time   • Multiple Vitamins-Minerals (MULTIVITAMIN WITH MINERALS) tablet tablet Take 1 tablet by mouth Daily.   4/8/2020 at Unknown time   • simvastatin (ZOCOR) 40 MG tablet Take 40 mg by mouth Every Night.   4/7/2020 at Unknown time   • vitamin D (ERGOCALCIFEROL) 1.25 MG (69637 UT) capsule capsule Take 50,000 Units by mouth Daily.   4/8/2020 at Unknown time   • vitamin E 1000 UNIT capsule Take 1,000 Units by mouth Daily.   4/8/2020 at Unknown time   • gabapentin (NEURONTIN) 300 MG capsule Take 1 capsule by mouth 3 (Three) Times a Day. (Patient taking differently: Take 300 mg by mouth 3 (Three) Times a Day As Needed.) 90 capsule 0 More than a month at Unknown time   • HYDROcodone-acetaminophen (NORCO) 5-325 MG per tablet Take 1 tablet by  "mouth Every 4 (Four) Hours As Needed for Moderate Pain  for up to 15 doses. 15 tablet 0 More than a month at Unknown time   • LORazepam (ATIVAN) 1 MG tablet Take 1 tablet by mouth 2 (Two) Times a Day As Needed for Anxiety or Sleep. 15 tablet 0 More than a month at Unknown time     Allergies:  Latex      There is no immunization history on file for this patient.    REVIEW OF SYSTEMS:  Please see the above history of present illness for pertinent positives and negatives.  The remainder of the patient's systems have been reviewed and are negative.     Vital Signs  Temp:  [98.3 °F (36.8 °C)-98.8 °F (37.1 °C)] 98.3 °F (36.8 °C)  Heart Rate:  [69-70] 69  Resp:  [18-20] 20  BP: (142-156)/(76-92) 156/76  Flowsheet Rows      First Filed Value   Admission Height  170 cm (66.93\") Documented at 04/08/2020 1234   Admission Weight  97.1 kg (214 lb) Documented at 04/08/2020 1234           Body mass index is 33.66 kg/m².      Physical Exam   Constitutional: He is oriented to person, place, and time.   Patient obese/ Body mass index is 33.66 kg/m².     Cardiovascular: Normal rate and regular rhythm.   Pulmonary/Chest: Effort normal and breath sounds normal.   Abdominal: Soft. Bowel sounds are normal.   Musculoskeletal:   Pain right hip from fracture and AKA right   Neurological: He is alert and oriented to person, place, and time.   Skin: Skin is warm and dry.   Psychiatric: He has a normal mood and affect. His behavior is normal. Judgment and thought content normal.   Nursing note and vitals reviewed.      Emotional Behavior:    Judgement and Insight: good   Mental Status:  Alertness  good   Memory:  good   Mood and Affect:         Depression  none               Anxiety  none    Debilities:   Physical Weakness  Yes from pain   Handicaps  Yes right AKA   Disabilities  Yes Right AKA   Agitation  no       Results Review:    I reviewed the patient's new clinical results.  Lab Results (most recent)     Procedure Component Value Units " Date/Time    T4, Free [[id number]]  (Normal) Collected:  04/08/20 1336    Specimen:  Blood Updated:  04/08/20 1524     Free T4 1.53 ng/dL     Narrative:       Results may be falsely increased if patient taking Biotin.      TSH [[id number]]  (Normal) Collected:  04/08/20 1336    Specimen:  Blood Updated:  04/08/20 1524     TSH 1.240 uIU/mL     [place] Draw [[id number]] Collected:  04/08/20 1336    Specimen:  Blood Updated:  04/08/20 1446    Narrative:       The following orders were created for panel order [place] Draw.  Procedure                               Abnormality         Status                     ---------                               -----------         ------                     Light Blue Top[[id number]]                                   Final result               Green Top (Gel)[[id number]]                                  Final result               Lavender Top[[id number]]                                     Final result               Gold Top - SST[[id number]]                                   Final result                 Please view results for these tests on the individual orders.    Light Blue Top [[id number]] Collected:  04/08/20 1336    Specimen:  Blood Updated:  04/08/20 1446     Extra Tube hold for add-on     Comment: Auto resulted       Green Top (Gel) [[id number]] Collected:  04/08/20 1336    Specimen:  Blood Updated:  04/08/20 1446     Extra Tube Hold for add-ons.     Comment: Auto resulted.       Lavender Top [[id number]] Collected:  04/08/20 1336    Specimen:  Blood Updated:  04/08/20 1446     Extra Tube hold for add-on     Comment: Auto resulted       Gold Top - SST [[id number]] Collected:  04/08/20 1336    Specimen:  Blood Updated:  04/08/20 1446     Extra Tube Hold for add-ons.     Comment: Auto resulted.       Comprehensive Metabolic Panel [[id number]]  (Abnormal) Collected:  04/08/20 1336    Specimen:  Blood Updated:  04/08/20 1406     Glucose 100 mg/dL      BUN 15 mg/dL      Creatinine 1.21  mg/dL      Sodium 137 mmol/L      Potassium 4.1 mmol/L      Chloride 105 mmol/L      CO2 20.0 mmol/L      Calcium 8.9 mg/dL      Total Protein 6.6 g/dL      Albumin 3.80 g/dL      ALT (SGPT) 48 U/L      AST (SGOT) 34 U/L      Comment: Specimen hemolyzed.  Results may be affected.        Alkaline Phosphatase 81 U/L      Total Bilirubin 1.2 mg/dL      eGFR Non African Amer 62 mL/min/1.73      Globulin 2.8 gm/dL      A/G Ratio 1.4 g/dL      BUN/Creatinine Ratio 12.4     Anion Gap 12.0 mmol/L     Narrative:       GFR Normal >60  Chronic Kidney Disease <60  Kidney Failure <15      CBC & Differential [[id number]] Collected:  04/08/20 1336    Specimen:  Blood Updated:  04/08/20 1353    Narrative:       The following orders were created for panel order CBC & Differential.  Procedure                               Abnormality         Status                     ---------                               -----------         ------                     CBC Auto Differential[[id number]]        Abnormal            Final result                 Please view results for these tests on the individual orders.    CBC Auto Differential [[id number]]  (Abnormal) Collected:  04/08/20 1336    Specimen:  Blood Updated:  04/08/20 1353     WBC 8.37 10*3/mm3      RBC 5.08 10*6/mm3      Hemoglobin 16.2 g/dL      Hematocrit 47.7 %      MCV 93.9 fL      MCH 31.9 pg      MCHC 34.0 g/dL      RDW 11.8 %      RDW-SD 40.8 fl      MPV 9.8 fL      Platelets 263 10*3/mm3      Neutrophil % 73.3 %      Lymphocyte % 15.7 %      Monocyte % 7.5 %      Eosinophil % 2.6 %      Basophil % 0.5 %      Immature Grans % 0.4 %      Neutrophils, Absolute 6.14 10*3/mm3      Lymphocytes, Absolute 1.31 10*3/mm3      Monocytes, Absolute 0.63 10*3/mm3      Eosinophils, Absolute 0.22 10*3/mm3      Basophils, Absolute 0.04 10*3/mm3      Immature Grans, Absolute 0.03 10*3/mm3           Imaging Results (Most Recent)     Procedure Component Value Units Date/Time    CT Lower Extremity  Right Without Contrast [[id number]] Collected:  04/08/20 1536     Updated:  04/08/20 1539    Narrative:       CT LE RT WO    INDICATION:    60-year-old male with a history of intertrochanteric hip fracture. 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 Fell today and has severe right limb and back pain.    TECHNIQUE:   CT of the right lower extremity without IV contrast. Coronal and sagittal reconstructions were obtained.  Radiation dose reduction techniques included automated exposure control or exposure modulation based on body size. Count of known CT and cardiac nuc  med studies performed in previous 12 months: 1.      COMPARISON:    None available.    FINDINGS:  The included bowel is unremarkable and the appendix is normal. The bladder and prostate gland appear within normal limits. There is no inguinal adenopathy or drainable fluid collection.    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 There is no  evidence of acetabular fracture. There is a complete oblique intertrochanteric fracture of the right hip. The fracture line is best demonstrated on the coronal reformats. There is mild medial displacement of the lesser trochanter medially and anteriorly  a distance of about 9 mm. No evidence of hip dislocation. There is edematous change of the thigh musculature on the right related to the fracture and displacement of the lesser trochanter.      Impression:         1. Complete intertrochanteric fracture of the right hip with displacement of the lesser trochanter medially and anteriorly a distance of up to about 9 mm. Edematous change of the anterior thigh musculature related to the fracture.  2. No evidence of dislocation or distinct acetabular fracture.  3. Asymmetric osteoporosis on the right  likely reflecting osteoporosis of disuse.    Signer Name: Tim Keen MD   Signed: 4/8/2020 3:36 PM   Workstation Name: [id number]    [facility]    XR Chest 1 View [[id number]] Collected:  04/08/20 1520     Updated:  04/08/20 1526    Narrative:       Chest x-ray portable    INDICATION: Right hip fracture, preop for surgery in a.m.    FINDINGS: Single frontal portable view the chest timed 4/8/2020 is submitted for review. Comparison chest x-ray is from 12/25/2019. There is a small amount of linear airspace disease/atelectasis at the left base. Lungs are otherwise clear. The right  perihilar opacity noted previously has resolved. There is no congestive failure. There is no pleural effusion or pneumothorax. The heart size is top normal.      Impression:       1. Small amount of linear airspace disease or atelectasis left base, otherwise no active disease.    Signer Name: Sabrina Hudson MD   Signed: 4/8/2020 3:20 PM   [email or web address]    [facility]    XR Spine Lumbar Complete 4+VW [[id number]] Collected:  04/08/20 1332     Updated:  04/08/20 1335    Narrative:       XR SPINE LUMBAR COMPLETE 4+VW-: 4/8/2020 12:52 PM     INDICATION:   60-year-old male with a history of osteosarcoma and right leg amputation  in 1989. Fell today and has severe right mammogram and back pain.     COMPARISON:   None available.     FINDINGS:  5 views of the lumbar spine. Vertebral body heights and alignment are  preserved. No acute fracture. No pars defect. No significant  degenerative change..       Impression:       Negative lumbar spine.     This report was finalized on 4/8/2020 1:33 PM by Dr. Tim Keen MD.       XR Hip With or Without Pelvis 2 - 3 View Right [[id number]] Collected:  04/08/20 1328     Updated:  04/08/20 1334    Narrative:       XR HIP W OR WO PELVIS 2-3 VIEW RIGHT-: 4/8/2020 12:53 PM     INDICATION:   60-year-old male with a history of right leg amputation in  1989  suffering a fall today. Severe right limb and back pain .     COMPARISON:   None available.     FINDINGS:  AP and frog-leg lateral view(s) of the right hip.  There is asymmetric  osteoporosis of the right hemipelvis and right hip, likely related to  osteoporosis of disuse. There is an oblique likely complete  intertrochanteric fracture of the right hip. There is displacement of  the lesser trochanter medially a distance of about 7 mm.  Status post  amputation of the right femur distal to the proximal third shaft.         Impression:          1. Oblique and trochanteric fracture of the right hip, likely complete  in nature. Mild displacement of the lesser trochanter medially 7 mm.     This report was finalized on 4/8/2020 1:31 PM by Dr. Tim Keen MD.           Images not reviewed    ECG/EMG Results (most recent)     Procedure Component Value Units Date/Time    ECG 12 Lead [[id number]] Collected:  04/08/20 1412     Updated:  04/08/20 1429    Narrative:       HEART RATE= 81  bpm  RR Interval= 744  ms  VT Interval= 162  ms  P Horizontal Axis= 15  deg  P Front Axis= 32  deg  QRSD Interval= 92  ms  QT Interval= 381  ms  QRS Axis= 31  deg  T Wave Axis= 29  deg  - NORMAL ECG -  Sinus rhythm  NO PRIOR TRACING AVAILABLE FOR COMPARISON  Electronically Signed By: Jose Slater ([facility]) 08-Apr-2020 14:29:34  Date and Time of Study: 2020-04-08 14:12:02        Images not reviewed    Assessment/Plan     Right intertrochanteric fracture    Falls risk    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     Myriam Berumen DO  04/08/20  16:16    Time: 50 min      Electronically signed by Myriam Berumen DO at 04/08/20 7660       Vital Signs (last day)     Date/Time   Temp   Temp src   Pulse   Resp   BP   Patient Position   SpO2    04/09/20 1207   --   --   93   --   --   --   92     04/09/20 1206   --   --   98   --   --   --   --    04/09/20 1205   --   --   98   --   106/69   --   --    04/09/20 1200   --   --   96   20   127/77   --   94    04/09/20 1155   --   --   93   20   123/73   --   98    04/09/20 1150   --   --   96   21   129/60   --   96    04/09/20 1145   --   --   96   23   116/69   --   95    04/09/20 1140   --   --   96   16   132/73   --   96    04/09/20 1135   --   --   96   15   128/71   --   92    04/09/20 1130   98.6 (37)   Oral   96   19   142/84   Lying   95    04/09/20 0934   --   --   73   15   123/70   --   98    04/09/20 0929   --   --   79   16   120/68   --   98    04/09/20 0924   --   --   72   15   123/80   --   97    04/09/20 0810   98 (36.7)   Temporal   66   15   116/77   Lying   94    04/09/20 0652   98.1 (36.7)   Oral   75   14   121/64   Lying   96    04/08/20 2359   97.8 (36.6)   Oral   78   18   125/66   Lying   95    04/08/20 2245   --   --   --   --   --   --   95    04/08/20 1939   97.7 (36.5)   Oral   84   22   133/69   Lying   95    04/08/20 1605   --   --   68   16   --   --   93    04/08/20 1540   98.3 (36.8)   Oral   69   20   156/76   Lying   94    04/08/20 14:14:05   --   --   70   18   152/92   --   93    04/08/20 1234   98.8 (37.1)   Oral   69   20   142/87   Lying   97              Lines, Drains & Airways    Active LDAs     Name:   Placement date:   Placement time:   Site:   Days:    Peripheral IV 04/08/20 1335 Right Forearm   04/08/20    1335    Forearm   less than 1    Peripheral IV 04/09/20 0829 Left;Posterior Hand   04/09/20    0829    Hand   less than 1    Closed/Suction Drain 1 Proximal;Right;Anterior Hip Accordion 7 Fr.   04/09/20    1051    Hip   less than 1         Inactive LDAs     Name:   Placement date:   Placement time:   Removal date:   Removal time:   Site:   Days:    [REMOVED] ETT    04/09/20    0943 created via procedure documentation    04/09/20    1121     less than 1                  Facility-Administered Medications as  of 4/9/2020   Medication Dose Route Frequency Provider Last Rate Last Dose   • acetaminophen (TYLENOL) tablet 650 mg  650 mg Oral Once PRN Ravi Avelar CRNA        Or   • acetaminophen (TYLENOL) suppository 650 mg  650 mg Rectal Once PRN Ravi Avelar CRNA       • [COMPLETED] acetaminophen (TYLENOL) tablet 1,000 mg  1,000 mg Oral Once Jorge Wilcox MD   1,000 mg at 04/09/20 0836   • atorvastatin (LIPITOR) tablet 20 mg  20 mg Oral Daily Myriam Berumen DO   20 mg at 04/08/20 1721   • bupivacaine liposome (EXPAREL) 1.3 % injection 266 mg  20 mL Injection Once Jorge Wilcox MD       • [COMPLETED] ceFAZolin Sodium-Dextrose (ANCEF) IVPB (duplex) 2 g  2 g Intravenous Once Jorge Wilcox MD   2 g at 04/09/20 0938   • [COMPLETED] dexamethasone (DECADRON) injection 8 mg  8 mg Intravenous Once Eduarda Azevedo CRNA   8 mg at 04/09/20 0924   • diphenhydrAMINE (BENADRYL) injection 12.5 mg  12.5 mg Intravenous Q15 Min PRN Ravi Avelar CRNA       • docusate sodium (COLACE) capsule 100 mg  100 mg Oral BID Myriam Berumen DO   100 mg at 04/08/20 2049   • [COMPLETED] famotidine (PEPCID) injection 20 mg  20 mg Intravenous 60 Min Pre-Op Eduarda Azevedo CRNA   20 mg at 04/09/20 0924   • gabapentin (NEURONTIN) capsule 300 mg  300 mg Oral TID Myriam Berumen DO   300 mg at 04/08/20 2049   • HYDROcodone-acetaminophen (NORCO) 5-325 MG per tablet 1 tablet  1 tablet Oral Q4H PRN Myriam Berumen DO   1 tablet at 04/08/20 2127   • [COMPLETED] HYDROmorphone (DILAUDID) injection 0.5 mg  0.5 mg Intravenous Once Anthony Gamble MD   0.5 mg at 04/08/20 1347   • HYDROmorphone (DILAUDID) injection 0.5 mg  0.5 mg Intravenous Q2H PRN Myriam Berumen DO   0.5 mg at 04/08/20 2245   • HYDROmorphone (DILAUDID) injection 0.5 mg  0.5 mg Intravenous Q10 Min PRN Ravi Avelar CRNA       • HYDROmorphone (DILAUDID) injection 1 mg  1 mg Intravenous Q10 Min PRN Ravi Avelar, SANDRA       •  lactated ringers infusion  9 mL/hr Intravenous Continuous Eduarda Azevedo CRNA       • levothyroxine (SYNTHROID, LEVOTHROID) tablet 125 mcg  125 mcg Oral Daily Myriam Berumen,        • LORazepam (ATIVAN) tablet 1 mg  1 mg Oral BID PRN Myriam Berumen, DO       • meperidine (DEMEROL) injection 12.5 mg  12.5 mg Intravenous Q5 Min PRN Ravi Avelar CRNA       • Midazolam HCl (PF) (VERSED) injection 1 mg  1 mg Intravenous Q5 Min PRN Eduarda Azevedo CRNA        Or   • Midazolam HCl (PF) (VERSED) injection 2 mg  2 mg Intravenous Q5 Min PRN Eduarda Azevedo CRNA   2 mg at 04/09/20 0924   • Midazolam HCl (PF) (VERSED) injection 1 mg  1 mg Intravenous Q5 Min PRN Ravi Avelar CRNA       • multivitamin with minerals 1 tablet  1 tablet Oral Daily Myriam Berumen DO       • [COMPLETED] ondansetron (ZOFRAN) injection 4 mg  4 mg Intravenous Once Anthony Gamble MD   4 mg at 04/08/20 1347   • [MAR Hold] ondansetron (ZOFRAN) tablet 4 mg  4 mg Oral Q6H PRN Myriam Berumen DO        Or   • [MAR Hold] ondansetron (ZOFRAN) injection 4 mg  4 mg Intravenous Q6H PRN Myriam Berumen DO       • [COMPLETED] ondansetron (ZOFRAN) injection 4 mg  4 mg Intravenous Once PRN Eduarda Azevedo CRNA   4 mg at 04/09/20 0924   • ondansetron (ZOFRAN) injection 4 mg  4 mg Intravenous Once PRN Ravi Avelar CRNA       • oxyCODONE-acetaminophen (PERCOCET) 7.5-325 MG per tablet 1 tablet  1 tablet Oral Once PRN Ravi Avelar CRNA       • [COMPLETED] pregabalin (LYRICA) capsule 75 mg  75 mg Oral Once Jorge Wilcox MD   75 mg at 04/09/20 0836   • sodium chloride 0.9 % flush 10 mL  10 mL Intravenous PRN Myriam Berumen DO       • [MAR Hold] sodium chloride 0.9 % flush 10 mL  10 mL Intravenous PRN Myriam Berumen,        • [MAR Hold] sodium chloride 0.9 % flush 10 mL  10 mL Intravenous Q12H Myriam Berumen DO   10 mL at 04/08/20 2220   • [MAR Hold] sodium chloride 0.9 % infusion  40 mL  40 mL Intravenous PRN Myriam Berumen DO       • [COMPLETED] Tranexamic Acid 1,000 mg in sodium chloride 0.9 % 100 mL  1,000 mg Intravenous Once Jorge Wilcox MD   1,000 mg at 04/09/20 1001   • [COMPLETED] Tranexamic Acid 1,000 mg in sodium chloride 0.9 % 100 mL  1,000 mg Intravenous Once Jorge Wilcox MD   1,000 mg at 04/09/20 1054     Blood Administration Record (From admission, onward)    None        Lab Results (last 48 hours)     Procedure Component Value Units Date/Time    T4, Free [[id number]]  (Normal) Collected:  04/08/20 1336    Specimen:  Blood Updated:  04/08/20 1524     Free T4 1.53 ng/dL     Narrative:       Results may be falsely increased if patient taking Biotin.      TSH [[id number]]  (Normal) Collected:  04/08/20 1336    Specimen:  Blood Updated:  04/08/20 1524     TSH 1.240 uIU/mL     [place] Draw [[id number]] Collected:  04/08/20 1336    Specimen:  Blood Updated:  04/08/20 1446    Narrative:       The following orders were created for panel order [place] Draw.  Procedure                               Abnormality         Status                     ---------                               -----------         ------                     Light Blue Top[[id number]]                                   Final result               Green Top (Gel)[[id number]]                                  Final result               Lavender Top[[id number]]                                     Final result               Gold Top - SST[[id number]]                                   Final result                 Please view results for these tests on the individual orders.    Light Blue Top [[id number]] Collected:  04/08/20 1336    Specimen:  Blood Updated:  04/08/20 1446     Extra Tube hold for add-on     Comment: Auto resulted       Green Top (Gel) [[id number]] Collected:  04/08/20 1336    Specimen:  Blood Updated:  04/08/20 1446     Extra Tube Hold for add-ons.     Comment: Auto resulted.       L[place] Top [[id number]]  Collected:  04/08/20 1336    Specimen:  Blood Updated:  04/08/20 1446     Extra Tube hold for add-on     Comment: Auto resulted       Gold Top - SST [[id number]] Collected:  04/08/20 1336    Specimen:  Blood Updated:  04/08/20 1446     Extra Tube Hold for add-ons.     Comment: Auto resulted.       Comprehensive Metabolic Panel [[id number]]  (Abnormal) Collected:  04/08/20 1336    Specimen:  Blood Updated:  04/08/20 1406     Glucose 100 mg/dL      BUN 15 mg/dL      Creatinine 1.21 mg/dL      Sodium 137 mmol/L      Potassium 4.1 mmol/L      Chloride 105 mmol/L      CO2 20.0 mmol/L      Calcium 8.9 mg/dL      Total Protein 6.6 g/dL      Albumin 3.80 g/dL      ALT (SGPT) 48 U/L      AST (SGOT) 34 U/L      Comment: Specimen hemolyzed.  Results may be affected.        Alkaline Phosphatase 81 U/L      Total Bilirubin 1.2 mg/dL      eGFR Non African Amer 62 mL/min/1.73      Globulin 2.8 gm/dL      A/G Ratio 1.4 g/dL      BUN/Creatinine Ratio 12.4     Anion Gap 12.0 mmol/L     Narrative:       GFR Normal >60  Chronic Kidney Disease <60  Kidney Failure <15      CBC & Differential [[id number]] Collected:  04/08/20 1336    Specimen:  Blood Updated:  04/08/20 1353    Narrative:       The following orders were created for panel order CBC & Differential.  Procedure                               Abnormality         Status                     ---------                               -----------         ------                     CBC Auto Differential[[id number]]        Abnormal            Final result                 Please view results for these tests on the individual orders.    CBC Auto Differential [[id number]]  (Abnormal) Collected:  04/08/20 1336    Specimen:  Blood Updated:  04/08/20 1353     WBC 8.37 10*3/mm3      RBC 5.08 10*6/mm3      Hemoglobin 16.2 g/dL      Hematocrit 47.7 %      MCV 93.9 fL      MCH 31.9 pg      MCHC 34.0 g/dL      RDW 11.8 %      RDW-SD 40.8 fl      MPV 9.8 fL      Platelets 263 10*3/mm3      Neutrophil %  73.3 %      Lymphocyte % 15.7 %      Monocyte % 7.5 %      Eosinophil % 2.6 %      Basophil % 0.5 %      Immature Grans % 0.4 %      Neutrophils, Absolute 6.14 10*3/mm3      Lymphocytes, Absolute 1.31 10*3/mm3      Monocytes, Absolute 0.63 10*3/mm3      Eosinophils, Absolute 0.22 10*3/mm3      Basophils, Absolute 0.04 10*3/mm3      Immature Grans, Absolute 0.03 10*3/mm3           Imaging Results (Last 24 Hours)     Procedure Component Value Units Date/Time    XR Hip With or Without Pelvis 2 - 3 View Right [[id number]] Collected:  04/09/20 1122     Updated:  04/09/20 1125    Narrative:       FLUOROSCOPY DURING RIGHT HIP INTRAOPERATIVE PINNING PROCEDURE,  04/09/2020     HISTORY:  Intertrochanteric fracture of the right hip yesterday     REPORT:  C-arm fluoroscopy was provided by radiology personnel in the OR during  surgical pinning of a right hip fracture. Fluoroscopy time was recorded  as 246 seconds. 5 spot film images were recorded for documentation  purposes. Please see operative note for details.     This report was finalized on 4/9/2020 11:23 AM by Dr. Tim Keen MD.       CT Lower Extremity Right Without Contrast [[id number]] Collected:  04/08/20 1536     Updated:  04/08/20 1539    Narrative:       CT LE RT WO    INDICATION:    60-year-old male with a history of intertrochanteric hip fracture. 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 Fell today and has severe right limb and back pain.    TECHNIQUE:   CT of the right lower extremity without IV contrast. Coronal and sagittal reconstructions were obtained.  Radiation dose reduction techniques included automated exposure control or exposure modulation based on body size. Count of known CT and cardiac nuc  med studies performed in previous 12 months: 1.      COMPARISON:    None available.    FINDINGS:  The included bowel is unremarkable and the appendix is normal. The bladder and prostate gland appear within normal limits. There is no  inguinal adenopathy or drainable fluid collection.    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 There is no  evidence of acetabular fracture. There is a complete oblique intertrochanteric fracture of the right hip. The fracture line is best demonstrated on the coronal reformats. There is mild medial displacement of the lesser trochanter medially and anteriorly  a distance of about 9 mm. No evidence of hip dislocation. There is edematous change of the thigh musculature on the right related to the fracture and displacement of the lesser trochanter.      Impression:         1. Complete intertrochanteric fracture of the right hip with displacement of the lesser trochanter medially and anteriorly a distance of up to about 9 mm. Edematous change of the anterior thigh musculature related to the fracture.  2. No evidence of dislocation or distinct acetabular fracture.  3. 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    Signer Name: Tim Keen MD   Signed: 4/8/2020 3:36 PM   Workstation Name: [id number]    [facility]    XR Chest 1 View [[id number]] Collected:  04/08/20 1520     Updated:  04/08/20 1526    Narrative:       Chest x-ray portable    INDICATION: Right hip fracture, preop for surgery in a.m.    FINDINGS: Single frontal portable view the chest timed 4/8/2020 is submitted for review. Comparison chest x-ray is from 12/25/2019. There is a small amount of linear airspace disease/atelectasis at the left base. Lungs are otherwise clear. The right  perihilar opacity noted previously has resolved. There is no congestive failure. There is no pleural effusion or pneumothorax. The heart size is top normal.      Impression:       1. Small amount of linear airspace disease  or atelectasis left base, otherwise no active disease.    Signer Name: Sabrina Hudson MD   Signed: 4/8/2020 3:20 PM   Workstation Name: [id number]    [facility]    XR Spine Lumbar Complete 4+VW [[id number]] Collected:  04/08/20 1332     Updated:  04/08/20 1335    Narrative:       XR SPINE LUMBAR COMPLETE 4+VW-: 4/8/2020 12:52 PM     INDICATION:   60-year-old male with a history of osteosarcoma and right leg amputation  in 1989. Fell today and has severe right mammogram and back pain.     COMPARISON:   None available.     FINDINGS:  5 views of the lumbar spine. Vertebral body heights and alignment are  preserved. No acute fracture. No pars defect. No significant  degenerative change..       Impression:       Negative lumbar spine.     This report was finalized on 4/8/2020 1:33 PM by Dr. Tim Keen MD.       XR Hip With or Without Pelvis 2 - 3 View Right [[id number]] Collected:  04/08/20 1328     Updated:  04/08/20 1334    Narrative:       XR HIP W OR WO PELVIS 2-3 VIEW RIGHT-: 4/8/2020 12:53 PM     INDICATION:   60-year-old male with a history of right leg amputation in 1989  suffering a fall today. Severe right limb and back pain .     COMPARISON:   None available.     FINDINGS:  AP and frog-leg lateral view(s) of the right hip.  There is asymmetric  osteoporosis of the right hemipelvis and right hip, likely related to  osteoporosis of disuse. There is an oblique likely complete  intertrochanteric fracture of the right hip. There is displacement of  the lesser trochanter medially a distance of about 7 mm.  Status post  amputation of the right femur distal to the proximal third shaft.         Impression:          1. Oblique and trochanteric fracture of the right hip, likely complete  in nature. Mild displacement of the lesser trochanter medially 7 mm.     This report was finalized on 4/8/2020 1:31 PM by Dr. Tim Keen MD.           ECG/EMG Results (last 24 hours)     Procedure Component  Value Units Date/Time    ECG 12 Lead [[id number]] Collected:  04/08/20 1412     Updated:  04/08/20 1429    Narrative:       HEART RATE= 81  bpm  RR Interval= 744  ms  WA Interval= 162  ms  P Horizontal Axis= 15  deg  P Front Axis= 32  deg  QRSD Interval= 92  ms  QT Interval= 381  ms  QRS Axis= 31  deg  T Wave Axis= 29  deg  - NORMAL ECG -  Sinus rhythm  NO PRIOR TRACING AVAILABLE FOR COMPARISON  Electronically Signed By: Jose Slater ([facility]) 08-Apr-2020 14:29:34  Date and Time of Study: 2020-04-08 14:12:02          Ventilator/Non-Invasive Ventilation Settings (From admission, onward)    None           Operative/Procedure Notes (last 24 hours) (Notes from 04/08/20 1215 through 04/09/20 1215)      Jorge Wilcox MD at 04/09/20 1009        Preoperative Diagnosis: Intertrochanteric hip fracture with displacement of lesser troches    Postoperative Diagnosis: Same    Procedure Performed: Open reduction total fixation of right intertrochanteric hip fracture with gamma plate using 135 degree angle 3 hole plate, 95 mm lag screw 4.0 distal cancellus screws with a locking screw    Surgeon: Brenden      Assistant: Yordy White    Anesthesia: Gen. 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  A roll was placed under the right hip.  Fluoroscopic evaluation then was taken to show that the head neck and intertrochanteric area and femur could be exposed and visualized with the position on the table.  Timeout completed benefit the patient procedure correctly.  The right hip and leg including the stump was prepped and after the prep dry for 3 minutes was  draped in sterile from usual manner timeout again completed.  Again with the aid of the fluoroscope procedures carried out.  Lateral sutures made in the greater trochanter distally for about 10 to 12 cm via blunt sharp dissection of the tensor fascia radha was identified incised entire length of the incision exposing the vastus lateralis musculature was elevated off of the greater trochanter at the tibia greater trochanter down distally to the femur was exposed and identified.  Using a 135 degree angle guide a guidepin was then placed into the femoral head.  It was placed posteriorly as the leg cannot be controlled internally rotate which is normally done for intertrochanteric hip fractures.  With a guidepin properly placed determined that a 95 mm lag screw would be needed and then reaming was carried out.  95 mm lag screw was then inserted then the 125 degree angle 4 hole plate was placed over the screw and then compressed and impacted secured to the lateral femur the fixation to the femur is carried out using 4.5 cortical screws a 36, 38 and 40 mm screw.  Intraoperative x-ray shows near anatomical reduction of proper placement of all fixation devices.  Throughout the procedure from incision to closure the wound is irrigated constantly with the first step solution.  Vancomycin powder was placed in the deep wound as well as a Hemovac drain.  The vastus lateralis was closed interrupted #2 Vicryl.  Tensor fascia radha was closed in interrupted #2 Vicryl.  Subcu was closed with #1 Vicryl and 2-0 Vicryl in a subcuticular closure was carried out using 3-0 strata fix.  A pernio Dermabond dressings then applied.  The patient awakened taken to recovery having tolerated procedure well.  All counts were correct          Dictated utilizing Dragon dictation     Electronically signed by Jorge Wilcox MD at 04/09/20 1125       Physician Progress Notes (last 24 hours) (Notes from 04/08/20 1215 through 04/09/20 1215)    No notes of  this type exist for this encounter.            Consult Notes (last 24 hours) (Notes from 04/08/20 1215 through 04/09/20 1215)      Jorge Wilcox MD at 04/08/20 1419           Orthopedic Consult      Patient: Charlie Solano    Date of Admission: 4/8/2020 12:35 PM    YOB: 1960    Medical Record Number: [id number]    Attending Physician: Anthony Gamble MD  Consulting Physician: Brenden      Chief Complaints: Right hip pain    History of Present Illness: 60 male is admitted to the emergency room here at [facility] after given a history of falling in his kitchen at his home landing on her right hip.  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  This time he is cancer free.  He does use a prosthesis to ambulate but was not wearing the prosthesis when he fall.  X-rays completed in the emergency room show a nondisplaced right intertrochanteric hip fracture and the patient admitted this time for definitive care.    Allergies:   Allergies   Allergen Reactions   • Latex Rash     swelling       Medications:   Home Medications:  No current facility-administered medications on file prior to encounter.      Current Outpatient Medications on File Prior to Encounter   Medication Sig   • levothyroxine (SYNTHROID, LEVOTHROID) 125 MCG tablet Take 125 mcg by mouth Daily.   • Multiple Vitamins-Minerals (MULTIVITAMIN WITH MINERALS) tablet tablet Take 1 tablet by mouth Daily.   • simvastatin (ZOCOR) 40 MG tablet Take 40 mg by mouth Every Night.   • gabapentin (NEURONTIN) 300 MG capsule Take 1 capsule by mouth 3 (Three) Times a Day.   • HYDROcodone-acetaminophen (NORCO) 5-325 MG per tablet Take 1 tablet by mouth Every 4 (Four) Hours As Needed for Moderate Pain  for up to 15 doses.   • LORazepam (ATIVAN) 1 MG tablet Take 1 tablet by mouth 2 (Two) Times a Day As Needed for Anxiety or Sleep.   • [DISCONTINUED] azithromycin  (ZITHROMAX) 250 MG tablet 1 tablet daily for 4 days.     Current Medications:  Scheduled Meds:   Continuous Infusions:   PRN Meds:.•  [COMPLETED] Insert peripheral IV **AND** sodium chloride    Past Medical History:   Diagnosis Date   • Disease of thyroid gland     Hypothyroid   • Hyperlipidemia     Controlled w/ SImvastatin   • Osteogenic sarcoma (CMS/HCC)     right leg        Past Surgical History:   Procedure Laterality Date   • ADENOIDECTOMY     • BELOW KNEE LEG AMPUTATION  1989    right leg   • SINUS SURGERY     • TONSILLECTOMY          Social History     Occupational History   • Not on file   Tobacco Use   • Smoking status: Never Smoker   Substance and Sexual Activity   • Alcohol use: Not Currently     Comment: rarely   • Drug use: Never   • Sexual activity: Defer      Social History     Social History Narrative   • Not on file        Family History   Problem Relation Age of Onset   • Transient ischemic attack Mother    • Heart attack Brother    • Diabetes Brother    • Heart disease Brother         CAD w/ CABG         Review of Systems:   HEENT: Patient denies any headaches, vision changes, change in hearing, or tinnitus, Patient denies any rhinorrhea,epistaxis, sinus pain, mouth or dental problems, sore throat or hoarseness, or dysphagia  Pulmonary: Patient denies any cough, congestion, SOA, or wheezing  Cardiovascular: Patient denies any chest pain, dyspnea, palpitations, weakness, intolerance of exercise, varicosities, swelling of extremities, known murmur  Gastrointestinal:  Patient denies nausea, vomiting, diarrhea, constipation, loss  of appetite, change in appetite, dysphagia, gas, heartburn, melena, change in bowel habits, use of laxatives or other drugs to alter the function of the gastrointestinal tract.  Genital/Urinary: Patient denies dysuria, change in color of urine, change in frequency of urination, pain with urgency, incontinence, retention, or nocturia.  Musculoskeletal: Patient denies  "increased warmth; redness; or swelling of joints; limitation of function; deformity; crepitation: pain in a joint or an extremity, the neck, or the back, especially with movement.  Neurological: Patient denies dizziness, tremor, ataxia, difficulty in speaking, change in speech, paresthesia, loss of sensation, seizures, syncope, changes in memory.  Endocrine system: Patient denies tremors, palpitations, intolerance of heat or cold, polyuria, polydipsia, polyphagia, diaphoresis, exophthalmos, or goiter.  Psychological: Patient denies thoughts/plans or harming self or other; depression,  insomnia, night terrors, cuca, memory loss, disorientation.  Skin: Patient denies any bruising, rashes, discoloration, pruritus, wounds, ulcers, decubiti, changes in the hair or nails  Hematopoietic: Patient denies history of spontaneous or excessive bleeding, epistaxis, hematuria, melena, fatigue, enlarged or tender lymph nodes, pallor, history of anemia.    Physical Exam: 60 y.o. male  General Appearance:    Alert, cooperative, in no acute distress                   Vitals:    04/08/20 1234 04/08/20 1414   BP: 142/87 152/92   BP Location: Right arm Left arm   Patient Position: Lying    Pulse: 69 70   Resp: 20 18   Temp: 98.8 °F (37.1 °C)    TempSrc: Oral    SpO2: 97% 93%   Weight: 97.1 kg (214 lb)    Height: 170 cm (66.93\")         Head:    Normocephalic, without obvious abnormality, atraumatic   Eyes:            Lids and lashes normal, conjunctivae and sclerae normal, no   icterus, no pallor, corneas clear, PERRLA   Ears:    Ears appear intact with no abnormalities noted   Throat:   No oral lesions, no thrush, oral mucosa moist   Neck:   No adenopathy, supple, trachea midline, no thyromegaly, no   carotid bruit, no JVD   Back:     No kyphosis present, no scoliosis present, no skin lesions,      erythema or scars, no tenderness to percussion or                   palpation,   range of motion normal   Lungs:     Clear to " auscultation,respirations regular, even and                  unlabored    Heart:    Regular rhythm and normal rate, normal S1 and S2, no            murmur, no gallop, no rub, no click   Chest Wall:    No abnormalities observed   Abdomen:     Normal bowel sounds, no masses, no organomegaly, soft        non-tender, non-distended, no guarding, no rebound                tenderness   Rectal:     Deferred   Extremities:    Examination of the right leg does show a well-healed right midshaft femoral stump.  The skin is intact there is no evident external bruising or ecchymosis noted.  Skin has good capillary refill.   Pulses:   Pulses palpable and equal bilaterally   Skin:   No bleeding, bruising or rash   Lymph nodes:   No palpable adenopathy   Neurologic:   Cranial nerves 2 - 12 grossly intact, sensation intact, DTR       present and equal bilaterally           Diagnostic Tests: X-rays show nondisplaced right intertrochanteric hip fracture.  We will also get preoperatively a CT scan with 3D reconstruction  [unfilled]      [unfilled]    Assessment: Nondisplaced right intertrochanteric hip fracture      Plan: Reviewed the x-rays with the patient and his wife via face time on the telephone documenting the intertrochanteric hip fracture.  Discussed treatment options of gamma nailing percutaneous screw fixation or plating.  Discussed we will proceed either with screw fixation or plating as the stump was not long enough for a intramedullary gamma nail.  Discussed the risk of surgery with the patient infection the need for multiple stages to eradicate infection, delayed union nonunion, failure of fixation, persistent pain discomfort the possibility that the patient can never weight-bear again with his prosthesis.  Discussed the healing 3 months or longer and he understands plan proceed with surgery tomorrow.          Date: 4/8/2020    Jorge Wilcox MD          Electronically signed by Jorge Wilcox MD at 04/08/20 5460

## 2020-04-09 NOTE — THERAPY EVALUATION
Acute Care - Physical Therapy Initial Evaluation   Anna Reynolds     Patient Name: Charlie Solano  : 1960  MRN: [id number]  Today's Date: 2020   Onset of Illness/Injury or Date of Surgery: 20  Date of Referral to PT: 20  Referring Physician: Dr. Wilcox      Admit Date: 2020    Visit Dx:     ICD-10-CM ICD-9-CM   1. 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 BP assessed and WNL. Notified RN   -BP     Row Name 20 4999          General Information    Patient Profile Reviewed?  yes  -BP     Onset of Illness/Injury or Date of Surgery  20  -BP     Referring Physician  Dr. Wilcox  -BP     Patient Observations  alert;cooperative;agree to therapy  -BP     Patient/Family Observations  Patient supine in bed with HOB elevated. Agreeable to PT evaluation. Hemovac in use   -BP     Prior Level of Function  -- see pertinent history   -BP     Equipment Currently Used at Home  crutches,  axillary;grab bar;shower chair  -BP     Pertinent History of Current Functional Problem  Patient presents s/p fall at home. Patient diagnosed with a right intertrochanteric fracture of his R femur. 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 Patient now s/p repair of fracture with a hip compression screw. Per ortho, patient not to use prosthesis at this time. Patient reports at baseline he works from home and the majority of the time he does not use his prosthesis and uses axillary crutches for mobility. He reports he has been primarily using cruthces without his prosthesis for approximately 2 years. He reports independence with ADL's and mobility with crutches.   -BP     Existing Precautions/Restrictions  fall  -BP     Limitations/Impairments  -- R AKA  -BP     Risks Reviewed  patient:;LOB;increased discomfort  -BP     Benefits Reviewed  patient:;improve function;increase independence;increase strength  -BP     Barriers to Rehab  none identified  -BP     Row Name 04/09/20 1352          Relationship/Environment    Lives With  spouse  -BP     Row Name 04/09/20 1352          Resource/Environmental Concerns    Current Living Arrangements  home/apartment/condo  -BP     Row Name 04/09/20 1352          Living Environment    Living Arrangements  house  -BP     Home Accessibility  stairs to enter home  -BP     Row Name 04/09/20 1352          Home Main Entrance    Number of Stairs, Main Entrance  three  -BP     Row Name 04/09/20 1352          Cognitive Assessment/Intervention- PT/OT    Orientation Status (Cognition)  oriented x 3  -BP     Follows Commands (Cognition)  WNL  -BP     Row Name 04/09/20 1352          Bed Mobility Assessment/Treatment    Bed Mobility Assessment/Treatment  supine-sit;sit-supine  -BP     Supine-Sit [place] (Bed Mobility)  minimum assist (75% patient effort)  -BP     Sit-Supine [place] (Bed Mobility)  minimum assist (75% patient effort);verbal cues  -BP      Bed Mobility, Safety Issues  decreased use of legs for bridging/pushing  -BP     Assistive Device (Bed Mobility)  bed rails;head of bed elevated  -BP     Row Name 04/09/20 1352          Transfer Assessment/Treatment    Transfer Assessment/Treatment  sit-stand transfer;stand-sit transfer  -BP     Comment (Transfers)  Patient demonstrates proper hand placement  -BP     Sit-Stand [place] (Transfers)  contact guard  -BP     Stand-Sit [place] (Transfers)  contact guard  -BP     Row Name 04/09/20 1352          Sit-Stand Transfer    Assistive Device (Sit-Stand Transfers)  walker, front-wheeled  -BP     Row Name 04/09/20 1352          Stand-Sit Transfer    Assistive Device (Stand-Sit Transfers)  walker, front-wheeled  -BP     Row Name 04/09/20 1352          Gait/Stairs Assessment/Training    [place] Level (Gait)  contact guard  -BP     Assistive Device (Gait)  walker, front-wheeled  -BP     Distance in Feet (Gait)  20  -BP     Deviations/Abnormal Patterns (Gait)  gait speed decreased  -BP     Comment (Gait/Stairs)  Patient demonstrates safe use of AD. No loss of balance noted. Patient reports dizzines during gait which limited distance.   -BP     Row Name 04/09/20 1352          General ROM    GENERAL ROM COMMENTS  L LE AROM WFL. R hip ROM not assessed   -BP     Row Name 04/09/20 1352          MMT (Manual Muscle Testing)    General MMT Comments  L LE strength functional.   -BP     Row Name 04/09/20 1352          Sensory Assessment/Intervention    Sensory General Assessment  -- Patient denies numbness and tingling   -BP     [facility] Name 04/09/20 1352          Pain Assessment    Additional Documentation  Pain Scale: Numbers Pre/Post-Treatment (Group)  -BP     Row Name 04/09/20 1352          Pain Scale: Numbers Pre/Post-Treatment    Pain Scale: Numbers, Pretreatment  3/10  -BP     Pain Scale: Numbers, Post-Treatment  4/10  -BP     Pain Location - Side  Right  -BP     Pain Location - Orientation  incisional  -BP      Pain Location  hip  -BP     Pain Intervention(s)  Repositioned;Ambulation/increased activity  -BP     Row Name             Residual Limb Assessment 04/08/20 2215 transfemoral (above knee), right    Residual Limb Assessment - Properties Group Date first assessed: 04/08/20  -MB Time first assessed: 2215  -MB Location: transfemoral (above knee), right  -MB Additional Comments: was amputated 31 yrs. ago  -MB    Row Name             Wound 04/09/20 1050 Right anterior hip Incision    Wound - Properties Group Date first assessed: 04/09/20  -ZANE Time first assessed: 1050  -ZANE Present on Hospital Admission: N  -ZANE Side: Right  -ZANE Orientation: anterior  -ZANE Location: hip  -ZANE Primary Wound Type: Incision  -ZANE Number of Sutures Placed: 12  -ZANE    Row Name 04/09/20 1352          Plan of Care Review    Plan of Care Reviewed With  patient  -BP     Outcome Summary  PT Evaluation Complete: Patient performs supine to/from sit transfers with CGA/min A, sit to/from stand transfers with CGA and gait x 20 feet with CGA and use of FWW.  Patient demonstrates safe use of AD, no loss of balance noted. O2 sats on room air at rest and with mobility noted to be 93-94%. Patient complains of dizziness upon sitting EOB and in standing. BP WNL. Notified RN. Patient would benefit from skilled PT services to address deficits in functional mobility. Plan to see 1-2 additional visits. Will continue to assess need for home health. Recommend use of FWW for mobility, patient agreeable.   -BP     Row Name 04/09/20 1352          Physical Therapy Clinical Impression    Date of Referral to PT  04/09/20  -BP     PT Diagnosis (PT Clinical Impression)  Decreased functional mobility   -BP     Criteria for Skilled Interventions Met (PT Clinical Impression)  yes;treatment indicated  -BP     Rehab Potential (PT Clinical Summary)  good, to achieve stated therapy goals  -BP     Predicted Duration of Therapy (PT)  2 days   -BP     Care Plan Review (PT)   evaluation/treatment results reviewed;care plan/treatment goals reviewed;risks/benefits reviewed  -BP     Row Name 04/09/20 1352          Vital Signs    Pre SpO2 (%)  95  -BP     O2 Delivery Pre Treatment  supplemental O2 6L O2/NC   -BP     Intra SpO2 (%)  90  -BP     O2 Delivery Intra Treatment  room air  -BP     Post SpO2 (%)  94  -BP     O2 Delivery Post Treatment  room air  -BP     Row Name 04/09/20 1352          Physical Therapy Goals    Bed Mobility Goal Selection (PT)  bed mobility, PT goal 1  -BP     Transfer Goal Selection (PT)  transfer, PT goal 1  -BP     Gait Training Goal Selection (PT)  gait training, PT goal 1  -BP     Row Name 04/09/20 1352          Bed Mobility Goal 1 (PT)    Activity/Assistive Device (Bed Mobility Goal 1, PT)  bed mobility activities, all  -BP     [place] Level/Cues Needed (Bed Mobility Goal 1, PT)  supervision required  -BP     Time Frame (Bed Mobility Goal 1, PT)  2 days  -BP     Progress/Outcomes (Bed Mobility Goal 1, PT)  goal ongoing  -BP     Row Name 04/09/20 1352          Transfer Goal 1 (PT)    Activity/Assistive Device (Transfer Goal 1, PT)  sit-to-stand/stand-to-sit;walker, rolling  -BP     [place] Level/Cues Needed (Transfer Goal 1, PT)  supervision required  -BP     Time Frame (Transfer Goal 1, PT)  2 days  -BP     Progress/Outcome (Transfer Goal 1, PT)  goal ongoing  -BP     Row Name 04/09/20 1352          Gait Training Goal 1 (PT)    Activity/Assistive Device (Gait Training Goal 1, PT)  gait (walking locomotion);walker, rolling  -BP     [place] Level (Gait Training Goal 1, PT)  supervision required  -BP     Distance (Gait Goal 1, PT)  50  -BP     Time Frame (Gait Training Goal 1, PT)  2 days  -BP     Progress/Outcome (Gait Training Goal 1, PT)  goal ongoing  -BP     Row Name 04/09/20 1352          Positioning and Restraints    Pre-Treatment Position  in bed  -BP     Post Treatment Position  bed  -BP     In Bed  notified nsg;supine;call light within  reach;encouraged to call for assist  -BP       User Key  (r) = Recorded By, (t) = Taken By, (c) = Cosigned By    Initials Name Provider Type    Natalie Crespo RN Registered Nurse    Lisa Romero RN Registered Nurse    Darius Joshua, PT Physical Therapist          PT Recommendation and Plan  Anticipated Discharge Disposition (PT): home  Planned Therapy Interventions (PT Eval): bed mobility training, gait training, home exercise program, patient/family education, transfer training, strengthening  Therapy Frequency (PT Clinical Impression): daily  Outcome Summary/Treatment Plan (PT)  Anticipated Equipment Needs at Discharge (PT): front wheeled walker  Anticipated Discharge Disposition (PT): home  Plan of Care Reviewed With: patient  Outcome Summary: PT Evaluation Complete: Patient performs supine to/from sit transfers with CGA/min A, sit to/from stand transfers with CGA and gait x 20 feet with CGA and use of FWW.  Patient demonstrates safe use of AD, no loss of balance noted. O2 sats on room air at rest and with mobility noted to be 93-94%. Patient complains of dizziness upon sitting EOB and in standing. BP WNL. Notified RN. Patient would benefit from skilled PT services to address deficits in functional mobility. Plan to see 1-2 additional visits. Will continue to assess need for home health. Recommend use of FWW for mobility, patient agreeable.  Outcome Measures     Row Name 04/09/20 1500 04/09/20 1353 04/09/20 1352       How much help from another person do you currently need...    Turning from your back to your side while in flat bed without using bedrails?  --  --  3  -BP    Moving from lying on back to sitting on the side of a flat bed without bedrails?  --  --  3  -BP    Moving to and from a bed to a chair (including a wheelchair)?  --  --  3  -BP    Standing up from a chair using your arms (e.g., wheelchair, bedside chair)?  --  --  3  -BP    Climbing 3-5 steps with a railing?  --  --  3   -BP    To walk in hospital room?  --  --  3  -BP    AM-PAC 6 Clicks Score (PT)  --  --  18  -BP       How much help from another is currently needed...    Putting on and taking off regular lower body clothing?  --  3  -EN  --    Bathing (including washing, rinsing, and drying)  --  3  -EN  --    Toileting (which includes using toilet bed pan or urinal)  --  3  -EN  --    Putting on and taking off regular upper body clothing  --  4  -EN  --    Taking care of personal grooming (such as brushing teeth)  --  4  -EN  --    Eating meals  --  4  -EN  --    AM-PAC 6 Clicks Score (OT)  --  21  -EN  --       Functional Assessment    Outcome Measure Options  AM-PAC 6 Clicks Daily Activity (OT)  -EN  --  AM-PAC 6 Clicks Basic Mobility (PT)  -BP      User Key  (r) = Recorded By, (t) = Taken By, (c) = Cosigned By    Initials Name Provider Type    Era [place] OTR Occupational Therapist    Darius Joshua, PT Physical Therapist         Time Calculation:   PT Charges     Row Name 04/09/20 1535             Time Calculation    Start Time  1352  -BP        User Key  (r) = Recorded By, (t) = Taken By, (c) = Cosigned By    Initials Name Provider Type    Darius Joshua, PT Physical Therapist        Therapy Charges for Today     Code Description Service Date Service Provider Modifiers Qty    94820115009 HC PT EVAL LOW COMPLEXITY 3 4/9/2020 Darius Lockhart, PT GP 1          PT G-Codes  Outcome Measure Options: AM-PAC 6 Clicks Daily Activity (OT)  AM-PAC 6 Clicks Score (PT): 18  AM-PAC 6 Clicks Score (OT): 21      Darius Lockhart PT  4/9/2020

## 2020-04-09 NOTE — ANESTHESIA PREPROCEDURE EVALUATION
Anesthesia Evaluation     Patient summary reviewed and Nursing notes reviewed   no history of anesthetic complications:  NPO Solid Status: > 8 hours  NPO Liquid Status: > 8 hours           Airway   Mallampati: II  TM distance: >3 FB  Neck ROM: full  No difficulty expected  Dental      Pulmonary     breath sounds clear to auscultation  (+) pneumonia (in December ) resolved , sleep apnea on CPAP,   (-) recent URI    ROS comment: Study Result     Chest x-ray portable     INDICATION: Right hip fracture, preop for surgery in a.m.     FINDINGS: Single frontal portable view the chest timed 4/8/2020 is submitted for review. Comparison chest x-ray is from 12/25/2019. There is a small amount of linear airspace disease/atelectasis at the left base. Lungs are otherwise clear. The right  perihilar opacity noted previously has resolved. There is no congestive failure. There is no pleural effusion or pneumothorax. The heart size is top normal.     IMPRESSION:  1. Small amount of linear airspace disease or atelectasis left base, otherwise no active disease.     Signer Name: Sabrina Hudson MD   Signed: 4/8/2020 3:20 PM   Workstation Name: [email or web address]    [facility]      Cardiovascular   Exercise tolerance: good (4-7 METS)    ECG reviewed  PT is on anticoagulation therapy  Rhythm: regular  Rate: normal    (+) hyperlipidemia,     ROS comment: Narrative & Impression     HEART RATE= 81  bpm  RR Interval= 744  ms  NJ Interval= 162  ms  P Horizontal Axis= 15  deg  P Front Axis= 32  deg  QRSD Interval= 92  ms  QT Interval= 381  ms  QRS Axis= 31  deg  T Wave Axis= 29  deg  - NORMAL ECG -  Sinus rhythm  NO PRIOR TRACING AVAILABLE FOR COMPARISON  Electronically Signed By: Jose Slater ([facility]) 08-Apr-2020 14:29:34  Date and Time of Study: 2020-04-08 14:12:02        Neuro/Psych  GI/Hepatic/Renal/Endo    (+) obesity,   renal disease stones, thyroid problem hypothyroidism    Musculoskeletal         ROS comment:  Intertrochanteric fracture of right hip (CMS/HCC)  Closed nondisplaced intertrochanteric fracture of right femur (CMS/HCC)    Trigger finger right middle finger   Abdominal   (+) obese,    Substance History - negative use     OB/GYN          Other      history of cancer remission    ROS/Med Hx Other: Osteogenic sarcoma (CMS/HCC) right leg     Right above the knee amputation                 Anesthesia Plan    ASA 3     general with block     intravenous induction     Anesthetic plan, all risks, benefits, and alternatives have been provided, discussed and informed consent has been obtained with: patient.  Use of blood products discussed with patient  Consented to blood products.

## 2020-04-09 NOTE — PROGRESS NOTES
"Hospitalist Team      Patient Care Team:  Kvng Hernandez MD as PCP - General (Family Medicine)        Chief Complaint:  Right intertrochanteric fracture    Subjective    Interval History and ROS:     Patient going to surgery today.  He was alert and oriented and in very good spirits.  Doing well.  Pain controlled    Objective    Vital Signs  Temp:  [97.7 °F (36.5 °C)-99 °F (37.2 °C)] 99 °F (37.2 °C)  Heart Rate:  [66-98] 95  Resp:  [11-23] 11  BP: (106-156)/(60-92) 125/68  Oxygen Therapy  SpO2: 92 %  Pulse Oximetry Type: Continuous  Device (Oxygen Therapy): room air  Device (Oxygen Therapy): nasal cannula with ETCO2  Flow (L/min): 6  ETCO2 (mmHg): 39 mmHg  $ ETCO2 Daily Assessment (RT Only): yes  Oximetry Probe Site Changed: No}  Flowsheet Rows      First Filed Value   Admission Height  170 cm (66.93\") Documented at 04/08/2020 1234   Admission Weight  97.1 kg (214 lb) Documented at 04/08/2020 1234        Body mass index is 33.66 kg/m².      Physical Exam:    Physical Exam   Constitutional: He is oriented to person, place, and time. He appears well-nourished.   Cardiovascular: Normal rate and regular rhythm.   Pulmonary/Chest: Effort normal and breath sounds normal.   Abdominal: Soft. Bowel sounds are normal.   Musculoskeletal:   Right AKA and right hip fracture.   Neurological: He is alert and oriented to person, place, and time.   Skin: Skin is warm and dry.   Nursing note and vitals reviewed.      Results Review:     I reviewed the patient's new clinical results.    Lab Results (last 24 hours)     Procedure Component Value Units Date/Time    T4, Free [[id number]]  (Normal) Collected:  04/08/20 1336    Specimen:  Blood Updated:  04/08/20 1524     Free T4 1.53 ng/dL     Narrative:       Results may be falsely increased if patient taking Biotin.      TSH [[id number]]  (Normal) Collected:  04/08/20 1336    Specimen:  Blood Updated:  04/08/20 1524     TSH 1.240 uIU/mL     [place] Draw [[id number]] Collected:  04/08/20 " 1336    Specimen:  Blood Updated:  04/08/20 1446    Narrative:       The following orders were created for panel order [place] [facility].  Procedure                               Abnormality         Status                     ---------                               -----------         ------                     Light Blue Top[[id number]]                                   Final result               Green Top (Gel)[[id number]]                                  Final result               Lavender Top[[id number]]                                     Final result               Gold Top - SST[[id number]]                                   Final result                 Please view results for these tests on the individual orders.    Light Blue Top [[id number]] Collected:  04/08/20 1336    Specimen:  Blood Updated:  04/08/20 1446     Extra Tube hold for add-on     Comment: Auto resulted       Green Top (Gel) [[id number]] Collected:  04/08/20 1336    Specimen:  Blood Updated:  04/08/20 1446     Extra Tube Hold for add-ons.     Comment: Auto resulted.       Lavender Top [[id number]] Collected:  04/08/20 1336    Specimen:  Blood Updated:  04/08/20 1446     Extra Tube hold for add-on     Comment: Auto resulted       Gold Top - SST [[id number]] Collected:  04/08/20 1336    Specimen:  Blood Updated:  04/08/20 1446     Extra Tube Hold for add-ons.     Comment: Auto resulted.       Comprehensive Metabolic Panel [[id number]]  (Abnormal) Collected:  04/08/20 1336    Specimen:  Blood Updated:  04/08/20 1406     Glucose 100 mg/dL      BUN 15 mg/dL      Creatinine 1.21 mg/dL      Sodium 137 mmol/L      Potassium 4.1 mmol/L      Chloride 105 mmol/L      CO2 20.0 mmol/L      Calcium 8.9 mg/dL      Total Protein 6.6 g/dL      Albumin 3.80 g/dL      ALT (SGPT) 48 U/L      AST (SGOT) 34 U/L      Comment: Specimen hemolyzed.  Results may be affected.        Alkaline Phosphatase 81 U/L      Total Bilirubin 1.2 mg/dL      eGFR Non African Amer 62 mL/min/1.73       Globulin 2.8 gm/dL      A/G Ratio 1.4 g/dL      BUN/Creatinine Ratio 12.4     Anion Gap 12.0 mmol/L     Narrative:       GFR Normal >60  Chronic Kidney Disease <60  Kidney Failure <15      CBC & Differential [[id number]] Collected:  04/08/20 1336    Specimen:  Blood Updated:  04/08/20 1353    Narrative:       The following orders were created for panel order CBC & Differential.  Procedure                               Abnormality         Status                     ---------                               -----------         ------                     CBC Auto Differential[[id number]]        Abnormal            Final result                 Please view results for these tests on the individual orders.    CBC Auto Differential [[id number]]  (Abnormal) Collected:  04/08/20 1336    Specimen:  Blood Updated:  04/08/20 1353     WBC 8.37 10*3/mm3      RBC 5.08 10*6/mm3      Hemoglobin 16.2 g/dL      Hematocrit 47.7 %      MCV 93.9 fL      MCH 31.9 pg      MCHC 34.0 g/dL      RDW 11.8 %      RDW-SD 40.8 fl      MPV 9.8 fL      Platelets 263 10*3/mm3      Neutrophil % 73.3 %      Lymphocyte % 15.7 %      Monocyte % 7.5 %      Eosinophil % 2.6 %      Basophil % 0.5 %      Immature Grans % 0.4 %      Neutrophils, Absolute 6.14 10*3/mm3      Lymphocytes, Absolute 1.31 10*3/mm3      Monocytes, Absolute 0.63 10*3/mm3      Eosinophils, Absolute 0.22 10*3/mm3      Basophils, Absolute 0.04 10*3/mm3      Immature Grans, Absolute 0.03 10*3/mm3           Imaging Results (Last 24 Hours)     Procedure Component Value Units Date/Time    FL C Arm During Surgery [[id number]] Resulted:  04/09/20 1258     Updated:  04/09/20 1258    XR Hip With or Without Pelvis 2 - 3 View Right [[id number]] Collected:  04/09/20 1122     Updated:  04/09/20 1125    Narrative:       FLUOROSCOPY DURING RIGHT HIP INTRAOPERATIVE PINNING PROCEDURE,  04/09/2020     HISTORY:  Intertrochanteric fracture of the right hip yesterday     REPORT:  C-arm fluoroscopy was provided  by radiology personnel in the OR during  surgical pinning of a right hip fracture. Fluoroscopy time was recorded  as 246 seconds. 5 spot film images were recorded for documentation  purposes. Please see operative note for details.     This report was finalized on 4/9/2020 11:23 AM by Dr. Tim Keen MD.       CT Lower Extremity Right Without Contrast [[id number]] Collected:  04/08/20 1536     Updated:  04/08/20 1539    Narrative:       CT LE RT WO    INDICATION:    60-year-old male with a history of intertrochanteric hip fracture. 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 Fell today and has severe right limb and back pain.    TECHNIQUE:   CT of the right lower extremity without IV contrast. Coronal and sagittal reconstructions were obtained.  Radiation dose reduction techniques included automated exposure control or exposure modulation based on body size. Count of known CT and cardiac nuc  med studies performed in previous 12 months: 1.      COMPARISON:    None available.    FINDINGS:  The included bowel is unremarkable and the appendix is normal. The bladder and prostate gland appear within normal limits. There is no inguinal adenopathy or drainable fluid collection.    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 There is no  evidence of acetabular fracture. There is a complete oblique intertrochanteric fracture of the right hip. The fracture line is best demonstrated on the coronal reformats. There is mild medial displacement of the lesser trochanter medially and anteriorly  a distance of about 9 mm. No evidence of hip dislocation. There is edematous change of the thigh musculature on the right related to the fracture and displacement of the lesser trochanter.      Impression:         1.  Complete intertrochanteric fracture of the right hip with displacement of the lesser trochanter medially and anteriorly a distance of up to about 9 mm. Edematous change of the anterior thigh musculature related to the fracture.  2. No evidence of dislocation or distinct acetabular fracture.  3. 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    Signer Name: Tim Keen MD   Signed: 4/8/2020 3:36 PM   Workstation Name: [id number]    [facility]    XR Chest 1 View [[id number]] Collected:  04/08/20 1520     Updated:  04/08/20 1526    Narrative:       Chest x-ray portable    INDICATION: Right hip fracture, preop for surgery in a.m.    FINDINGS: Single frontal portable view the chest timed 4/8/2020 is submitted for review. Comparison chest x-ray is from 12/25/2019. There is a small amount of linear airspace disease/atelectasis at the left base. Lungs are otherwise clear. The right  perihilar opacity noted previously has resolved. There is no congestive failure. There is no pleural effusion or pneumothorax. The heart size is top normal.      Impression:       1. Small amount of linear airspace disease or atelectasis left base, otherwise no active disease.    Signer Name: Sabrina Hudson MD   Signed: 4/8/2020 3:20 PM   [email or web address]    [facility]          Xray images not reviewed personally by physician.      ECG tracing not reviewed personally by physician  ECG/EMG Results (most recent)     Procedure Component Value Units Date/Time    ECG 12 Lead [[id number]] Collected:  04/08/20 1412     Updated:  04/08/20 1429    Narrative:       HEART RATE= 81  bpm  RR Interval= 744  ms  VA Interval= 162  ms  P Horizontal Axis= 15  deg  P Front Axis= 32  deg  QRSD Interval= 92  ms  QT Interval= 381  ms  QRS Axis= 31  deg  T Wave Axis= 29  deg  - NORMAL ECG -  Sinus rhythm  NO PRIOR TRACING AVAILABLE FOR COMPARISON  Electronically Signed By:  Jose Slater ([facility]) 08-Apr-2020 14:29:34  Date and Time of Study: 2020-04-08 14:12:02          Medication Review:   I have reviewed the patient's current medication list    Current Facility-Administered Medications:   •  [START ON 4/10/2020] aspirin EC tablet 325 mg, 325 mg, Oral, Daily, Jorge Wilcox MD  •  atorvastatin (LIPITOR) tablet 20 mg, 20 mg, Oral, Daily, Jorge Wilcox MD, 20 mg at 04/08/20 1721  •  ceFAZolin Sodium-Dextrose (ANCEF) IVPB (duplex) 2 g, 2 g, Intravenous, Q8H, Jorge Wilcox MD  •  docusate sodium (COLACE) capsule 100 mg, 100 mg, Oral, BID, Jorge Wilcox MD, 100 mg at 04/08/20 2049  •  gabapentin (NEURONTIN) capsule 300 mg, 300 mg, Oral, TID, Jorge Wilcox MD, 300 mg at 04/08/20 2049  •  HYDROmorphone (DILAUDID) injection 0.5 mg, 0.5 mg, Intravenous, Q2H PRN, Jorge Wilcox MD, 0.5 mg at 04/08/20 2245  •  lactated ringers infusion, 9 mL/hr, Intravenous, Continuous, Jorge Wilcox MD, Stopped at 04/09/20 1331  •  lactated ringers infusion, 30 mL/hr, Intravenous, Continuous, Jorge Wilcox MD  •  levothyroxine (SYNTHROID, LEVOTHROID) tablet 125 mcg, 125 mcg, Oral, Daily, Jorge Wilcox MD  •  LORazepam (ATIVAN) tablet 1 mg, 1 mg, Oral, BID PRN, Jorge Wilcox MD  •  multivitamin with minerals 1 tablet, 1 tablet, Oral, Daily, Jorge Wilcox MD  •  ondansetron (ZOFRAN) tablet 4 mg, 4 mg, Oral, Q6H PRN **OR** ondansetron (ZOFRAN) injection 4 mg, 4 mg, Intravenous, Q6H PRN, Jorge Wilcox MD  •  oxyCODONE-acetaminophen (PERCOCET) 7.5-325 MG per tablet 2 tablet, 2 tablet, Oral, Q4H PRN, Jorge Wilcox MD  •  [COMPLETED] Insert peripheral IV, , , Once **AND** sodium chloride 0.9 % flush 10 mL, 10 mL, Intravenous, PRN, Jorge Wilcox MD  •  sodium chloride 0.9 % flush 10 mL, 10 mL, Intravenous, PRN, Jorge Wilcox MD  •  sodium chloride 0.9 % flush 10 mL, 10 mL, Intravenous, Q12H, Jorge Wilcox MD, 10 mL at 04/08/20 2220  •  sodium chloride 0.9 % infusion 40 mL, 40 mL, Intravenous, PRN, Brenden,  MD Jorge      Assessment/Plan        Right intertrochanteric fracture/ Surgery today     Falls risk     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    Myriam Berumen DO  04/09/20  13:38      Time: 15 min

## 2020-04-09 NOTE — ANESTHESIA PROCEDURE NOTES
Airway  Urgency: elective    Date/Time: 4/9/2020 9:43 AM  Airway not difficult    General Information and Staff    Patient location during procedure: OR  CRNA: Ravi Avelar CRNA    Indications and Patient Condition  Indications for airway management: airway protection    Preoxygenated: yes  MILS maintained throughout  Mask difficulty assessment: 1 - vent by mask    Final Airway Details  Final airway type: endotracheal airway      Successful airway: ETT  Cuffed: yes   Successful intubation technique: direct laryngoscopy and RSI  Endotracheal tube insertion site: oral  Blade: Jasson  Blade size: 4  ETT size (mm): 7.5  Cormack-Lehane Classification: grade I - full view of glottis  Placement verified by: chest auscultation and capnometry   Measured from: lips  ETT/EBT  to lips (cm): 21  Number of attempts at approach: 1  Assessment: lips, teeth, and gum same as pre-op and atraumatic intubation    Additional Comments  To OR, monitors and O2 on. Smooth IV ind. - intubated x 1 attempt through clear cords + BBS. Tape OU. Pressure points checked and padded

## 2020-04-09 NOTE — THERAPY EVALUATION
Acute Care - Occupational Therapy Initial Evaluation   [place]     Patient Name: Charlie Solano  : 1960  MRN: [id number]  Today's Date: 2020  Onset of Illness/Injury or Date of Surgery: 20  Date of Referral to OT: 20  Referring Physician: Dr. Wilcox    Admit Date: 2020       ICD-10-CM ICD-9-CM   1. Closed nondisplaced intertrochanteric fracture of right femur, initial encounter (CMS/Formerly [facility]) S72.144A 820.21     Patient Active Problem List   Diagnosis   • Intertrochanteric fracture of right hip (CMS/Formerly [facility])   • Closed nondisplaced intertrochanteric fracture of right femur (CMS/Formerly [facility])     Past Medical History:   Diagnosis Date   • Disease of thyroid gland     Hypothyroid   • Hyperlipidemia     Controlled w/ SImvastatin   • Osteogenic sarcoma (CMS/Formerly [facility])     right leg     Past Surgical History:   Procedure Laterality Date   • ADENOIDECTOMY     • BELOW KNEE LEG AMPUTATION      right leg   • SINUS SURGERY     • TONSILLECTOMY            OT ASSESSMENT FLOWSHEET (last 12 hours)      Occupational Therapy Evaluation     Row Name 20 1353                   OT Evaluation Time/Intention    Subjective Information  complains of;dizziness in standing  -EN        Document Type  evaluation  -EN        Mode of Treatment  occupational therapy  -EN        Symptoms Noted During/After Treatment  fatigue  -EN        Comment  Patient initially had dizziness after supine to sit and sit to stand.   -EN           General Information    Patient Profile Reviewed?  yes  -EN        Onset of Illness/Injury or Date of Surgery  20  -EN        Referring Physician  Dr. Wilcox  -EN        Patient Observations  alert;cooperative;agree to therapy  -EN        Patient/Family Observations  Patient reclined in bed, agreed to OT evaluation.  -EN        Prior Level of Function  -- see pertinent histoy  -EN        Equipment Currently Used at Home  crutches, axillary;shower chair;grab bar reacher  -EN        Pertinent History of  Current Functional Problem  Patient suffered a fall at home and fractured his right hip. Patient at baseline uses crutches for mobility due to a AKA over 30 years ago. Patient has a prosthesis that he usually wears however did not have it on when he fell. Patient is now s/p open reduction total fixation of the right intertrochanteric hip fracture with gamma plate. At baseline he is independent with ADLs and mobility. He lives with his spouse and 22 year old son and will have assist if needed.   -EN        Existing Precautions/Restrictions  fall  -EN        Limitations/Impairments  other (see comments) AKA, right   -EN        Equipment Ordered for Patient  -- will need rolling walker  -EN        Risks Reviewed  patient:;increased discomfort;LOB;dizziness  -EN        Benefits Reviewed  patient:;improve function;increase independence;increase strength;increase balance  -EN        Barriers to Rehab  none identified  -EN           Relationship/Environment    Primary Source of Support/Comfort  spouse  -EN        Lives With  child(mirna), adult;spouse  -EN           Resource/Environmental Concerns    Current Living Arrangements  home/apartment/condo  -EN           Home Main Entrance    Number of Stairs, Main Entrance  three  -EN           Cognitive Assessment/Intervention- PT/OT    Orientation Status (Cognition)  oriented x 3  -EN        Follows Commands (Cognition)  WNL  -EN           Bed Mobility Assessment/Treatment    Bed Mobility Assessment/Treatment  supine-sit;sit-supine  -EN        Supine-Sit [place] (Bed Mobility)  minimum assist (75% patient effort);verbal cues  -EN        Sit-Supine [place] (Bed Mobility)  verbal cues;minimum assist (75% patient effort)  -EN        Bed Mobility, Safety Issues  decreased use of legs for bridging/pushing  -EN        Assistive Device (Bed Mobility)  bed rails;head of bed elevated  -EN           Functional Mobility    Functional Mobility- Ind. Level  contact guard  assist;verbal cues required  -EN        Functional Mobility- Device  rolling walker  -EN        Functional Mobility-Distance (Feet)  20  -EN           Transfer Assessment/Treatment    Transfer Assessment/Treatment  sit-stand transfer;stand-sit transfer  -EN           Sit-Stand Transfer    Sit-Stand [place] (Transfers)  contact guard  -EN        Assistive Device (Sit-Stand Transfers)  walker, front-wheeled  -EN           Stand-Sit Transfer    Stand-Sit [place] (Transfers)  contact guard;verbal cues  -EN        Assistive Device (Stand-Sit Transfers)  walker, front-wheeled  -EN           ADL Assessment/Intervention    BADL Assessment/Intervention  lower body dressing  -EN           Lower Body Dressing Assessment/Training    Comment (Lower Body Dressing)  anticipate min assist for LB ADLs. Reports he has a reacher at home. Will continue to assess for other long handled equipment needs.  -EN           General ROM    GENERAL ROM COMMENTS  B UE AROM WFL  -EN           MMT (Manual Muscle Testing)    General MMT Comments  B UE strength WFL  -EN           Positioning and Restraints    Pre-Treatment Position  in bed  -EN        Post Treatment Position  bed  -EN        In Bed  notified nsg;call light within reach;encouraged to call for assist  -EN           Pain Assessment    Additional Documentation  Pain Scale: Numbers Pre/Post-Treatment (Group)  -EN           Pain Scale: Numbers Pre/Post-Treatment    Pain Scale: Numbers, Pretreatment  3/10  -EN        Pain Scale: Numbers, Post-Treatment  4/10  -EN        Pain Location - Side  Right  -EN        Pain Location - Orientation  incisional  -EN        Pain Intervention(s)  Repositioned  -EN           Residual Limb Assessment 04/08/20 2215 transfemoral (above knee), right    Residual Limb Assessment - Properties Group Date first assessed: 04/08/20  -MB Time first assessed: 2215 -MB Location: transfemoral (above knee), right  -MB Additional Comments: was amputated 31 yrs.  ago  -MB       Wound 04/09/20 1050 Right anterior hip Incision    Wound - Properties Group Date first assessed: 04/09/20  -ZANE Time first assessed: 1050  -ZANE Present on Hospital Admission: N  -ZANE Side: Right  -ZANE Orientation: anterior  -ZANE Location: hip  -ZANE Primary Wound Type: Incision  -ZANE Number of Sutures Placed: 12  -ZANE       Plan of Care Review    Plan of Care Reviewed With  patient  -EN        Outcome Summary  OT evaluation completed. Patient performed bed mobility with min assist. Patient stood from EOB with CGA and performed functional mobility with rolling walker and CGA X 20 ft. Anticipate min assist for LB ADLs. OT to follow while in the hospital. Patient will need a rolling walker at discharge.  -EN           Clinical Impression (OT)    Date of Referral to OT  04/09/20  -EN        OT Diagnosis  decreased ADLs  -EN        Criteria for Skilled Therapeutic Interventions Met (OT Eval)  yes;treatment indicated  -EN        Rehab Potential (OT Eval)  good, to achieve stated therapy goals  -EN        Therapy Frequency (OT Eval)  other (see comments) 1-2 visits  -EN        Predicted Duration of Therapy Intervention (Therapy Eval)  will continue to assess  -EN        Care Plan Review (OT)  evaluation/treatment results reviewed  -EN        Anticipated Equipment Needs at Discharge (OT)  other (see comments) may need LH AE  -EN        Anticipated Discharge Disposition (OT)  home;other (see comments) with spouse  -EN           Planned OT Interventions    Planned Therapy Interventions (OT Eval)  adaptive equipment training;BADL retraining;transfer/mobility retraining  -EN           OT Goals    Transfer Goal Selection (OT)  transfer, OT goal 1  -EN        Dressing Goal Selection (OT)  dressing, OT goal 1  -EN           Transfer Goal 1 (OT)    Activity/Assistive Device (Transfer Goal 1, OT)  sit-to-stand/stand-to-sit;toilet;walker, rolling;commode, bedside without drop arms  -EN        [place] Level/Cues Needed  (Transfer Goal 1, OT)  supervision required  -EN        Time Frame (Transfer Goal 1, OT)  by discharge  -EN        Progress/Outcome (Transfer Goal 1, OT)  other (see comments) new goal  -EN           Dressing Goal 1 (OT)    Activity/Assistive Device (Dressing Goal 1, OT)  lower body dressing;other (see comments) LH AE if needed  -EN        [place]/Cues Needed (Dressing Goal 1, OT)  supervision required  -EN        Time Frame (Dressing Goal 1, OT)  by discharge  -EN        Progress/Outcome (Dressing Goal 1, OT)  other (see comments) new goal  -EN           Living Environment    Home Accessibility  stairs to enter home  -EN          User Key  (r) = Recorded By, (t) = Taken By, (c) = Cosigned By    Initials Name Effective Dates    ZANE Natalie Moon RN 06/16/16 -     Era Louise, OTR 06/22/16 -     Lisa Romero RN 05/10/19 -                OT Recommendation and Plan  Outcome Summary/Treatment Plan (OT)  Anticipated Equipment Needs at Discharge (OT): other (see comments)(may need LH AE)  Anticipated Discharge Disposition (OT): home, other (see comments)(with spouse)  Planned Therapy Interventions (OT Eval): adaptive equipment training, BADL retraining, transfer/mobility retraining  Therapy Frequency (OT Eval): other (see comments)(1-2 visits)  Plan of Care Review  Plan of Care Reviewed With: patient  Plan of Care Reviewed With: patient  Outcome Summary: OT evaluation completed. Patient performed bed mobility with min assist. Patient stood from EOB with CGA and performed functional mobility with rolling walker and CGA X 20 ft. Anticipate min assist for LB ADLs. OT to follow while in the hospital. Patient will need a rolling walker at discharge.    Outcome Measures     Row Name 04/09/20 1500 04/09/20 1227          How much help from another is currently needed...    Putting on and taking off regular lower body clothing?  --  3  -EN     Bathing (including washing, rinsing, and drying)  --  3   -EN     Toileting (which includes using toilet bed pan or urinal)  --  3  -EN     Putting on and taking off regular upper body clothing  --  4  -EN     Taking care of personal grooming (such as brushing teeth)  --  4  -EN     Eating meals  --  4  -EN     AM-PAC 6 Clicks Score (OT)  --  21  -EN        Functional Assessment    Outcome Measure Options  AM-PAC 6 Clicks Daily Activity (OT)  -EN  --       User Key  (r) = Recorded By, (t) = Taken By, (c) = Cosigned By    Initials Name Provider Type    Era Louise OTR Occupational Therapist          Time Calculation:   Time Calculation- OT     Row Name 04/09/20 1529             Time Calculation- OT    OT Start Time  1352  -EN        User Key  (r) = Recorded By, (t) = Taken By, (c) = Cosigned By    Initials Name Provider Type    Era Louise OTR Occupational Therapist        Therapy Charges for Today     Code Description Service Date Service Provider Modifiers Qty    [id number]  OT EVAL LOW COMPLEXITY 3 4/9/2020 Era Lynn OTR GO 1               HU Nguyen  4/9/2020

## 2020-04-09 NOTE — ANESTHESIA POSTPROCEDURE EVALUATION
Patient: Charlie Solano    Procedure Summary     Date:  04/09/20 Room / Location:   [facility] OR 3 /  [facility]    Anesthesia Start:  0935 Anesthesia Stop:  1133    Procedure:  HIP COMPRESSION SCREW-[place] compression plate (Right Hip) Diagnosis:       Closed nondisplaced intertrochanteric fracture of right femur, initial encounter (CMS/Formerly [facility])      (Closed nondisplaced intertrochanteric fracture of right femur, initial encounter (CMS/Formerly [facility]) [S72.144A])    Surgeon:  Jorge Wilcox MD Provider:  Ravi Avelar CRNA    Anesthesia Type:  general with block ASA Status:  3          Anesthesia Type: general with block    Vitals  Vitals Value Taken Time   /73 4/9/2020  1:00 PM   Temp 98.6 °F (37 °C) 4/9/2020 11:30 AM   Pulse 90 4/9/2020  1:04 PM   Resp 15 4/9/2020  1:00 PM   SpO2 86 % 4/9/2020  1:04 PM   Vitals shown include unvalidated device data.        Post Anesthesia Care and Evaluation    Patient location during evaluation: bedside  Patient participation: complete - patient participated  Level of consciousness: awake and alert  Pain score: 0  Pain management: adequate  Airway patency: patent  Anesthetic complications: No anesthetic complications    Cardiovascular status: acceptable  Respiratory status: acceptable  Hydration status: acceptable

## 2020-04-09 NOTE — PLAN OF CARE
Problem: Patient Care Overview  Goal: Plan of Care Review  Flowsheets (Taken 4/9/2020 1800)  Outcome Summary: OT evaluation completed. Patient performed bed mobility with min assist. Patient stood from EOB with CGA and performed functional mobility with rolling walker and CGA X 20 ft. Anticipate min assist for LB ADLs. OT to follow while in the hospital. Patient will need a rolling walker at discharge.

## 2020-04-09 NOTE — ANESTHESIA PROCEDURE NOTES
Peripheral Block      Patient location during procedure: pre-op  Reason for block: post-op pain management and MD/surgeon's request  Performed by  CRNA: Ravi Avelar CRNA  Preanesthetic Checklist  Completed: patient identified, site marked, surgical consent, pre-op evaluation, timeout performed, IV checked, risks and benefits discussed and monitors and equipment checked  Prep:  Sterile barriers:cap, gloves, gown, mask and sterile barriers  Prep: ChloraPrep  Patient monitoring: blood pressure monitoring, continuous pulse oximetry and EKG  Procedure  Sedation:yes    Guidance:ultrasound guided  Images:still images obtained    Block Type:lateral femoral  Injection Technique:single-shot  Needle Type:echogenic  Needle Gauge:22 G      Medications Used: ropivacaine (NAROPIN) 0.2% injection, 10 mL  Med admintered at 4/9/2020 9:30 AM      Post Assessment  Injection Assessment: negative aspiration for heme, no paresthesia on injection and incremental injection  Patient Tolerance:comfortable throughout block  Complications:no

## 2020-04-09 NOTE — PLAN OF CARE
Problem: Patient Care Overview  Goal: Plan of Care Review  Outcome: Ongoing (interventions implemented as appropriate)  Flowsheets (Taken 4/9/2020 0412)  Progress: no change  Plan of Care Reviewed With: patient  Outcome Summary: surgery this am, npo since midnight, chg done, consent signed on chart

## 2020-04-09 NOTE — PLAN OF CARE
Problem: Patient Care Overview  Goal: Plan of Care Review  Flowsheets (Taken 4/9/2020 9535)  Outcome Summary: PT Evaluation Complete: Patient performs supine to/from sit transfers with CGA/min A, sit to/from stand transfers with CGA and gait x 20 feet with CGA and use of FWW.  Patient demonstrates safe use of AD, no loss of balance noted. O2 sats on room air at rest and with mobility noted to be 93-94%. Patient complains of dizziness upon sitting EOB and in standing. BP WNL. Notified RN. Patient would benefit from skilled PT services to address deficits in functional mobility. Plan to see 1-2 additional visits. Will continue to assess need for home health. Recommend use of FWW for mobility, patient agreeable.

## 2020-04-09 NOTE — OP NOTE
Preoperative Diagnosis: Intertrochanteric hip fracture with displacement of lesser troches    Postoperative Diagnosis: Same    Procedure Performed: Open reduction total fixation of right intertrochanteric hip fracture with gamma plate using 135 degree angle 3 hole plate, 95 mm lag screw 4.0 distal cancellus screws with a locking screw    Surgeon: Brenden      Assistant: Yordy White    Anesthesia: Gen. 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  A roll was placed under the right hip.  Fluoroscopic evaluation then was taken to show that the head neck and intertrochanteric area and femur could be exposed and visualized with the position on the table.  Timeout completed benefit the patient procedure correctly.  The right hip and leg including the stump was prepped and after the prep dry for 3 minutes was draped in sterile from usual manner timeout again completed.  Again with the aid of the fluoroscope procedures carried out.  Lateral sutures made in the greater trochanter distally for about 10 to 12 cm via blunt sharp dissection of the tensor fascia radha was identified incised entire length of the incision exposing the vastus lateralis musculature was elevated off of the greater trochanter at the tibia greater trochanter down distally to the femur was exposed and identified.  Using a 135 degree angle guide a guidepin was then placed into the femoral head.  It was placed posteriorly as the leg cannot be controlled internally rotate which is normally done for intertrochanteric hip fractures.  With a guidepin properly placed determined that  a 95 mm lag screw would be needed and then reaming was carried out.  95 mm lag screw was then inserted then the 125 degree angle 4 hole plate was placed over the screw and then compressed and impacted secured to the lateral femur the fixation to the femur is carried out using 4.5 cortical screws a 36, 38 and 40 mm screw.  Intraoperative x-ray shows near anatomical reduction of proper placement of all fixation devices.  Throughout the procedure from incision to closure the wound is irrigated constantly with the first step solution.  Vancomycin powder was placed in the deep wound as well as a Hemovac drain.  The vastus lateralis was closed interrupted #2 Vicryl.  Tensor fascia radha was closed in interrupted #2 Vicryl.  Subcu was closed with #1 Vicryl and 2-0 Vicryl in a subcuticular closure was carried out using 3-0 strata fix.  A pernio Dermabond dressings then applied.  The patient awakened taken to recovery having tolerated procedure well.  All counts were correct          Dictated utilizing Dragon dictation

## 2020-04-10 VITALS
BODY MASS INDEX: 33.73 KG/M2 | SYSTOLIC BLOOD PRESSURE: 131 MMHG | HEIGHT: 67 IN | OXYGEN SATURATION: 94 % | WEIGHT: 214.9 LBS | DIASTOLIC BLOOD PRESSURE: 65 MMHG | TEMPERATURE: 97.6 F | HEART RATE: 78 BPM | RESPIRATION RATE: 18 BRPM

## 2020-04-10 LAB
HCT VFR BLD AUTO: 37.5 % (ref 37.5–51)
HGB BLD-MCNC: 12.5 G/DL (ref 13–17.7)

## 2020-04-10 PROCEDURE — 85014 HEMATOCRIT: CPT | Performed by: ORTHOPAEDIC SURGERY

## 2020-04-10 PROCEDURE — 97530 THERAPEUTIC ACTIVITIES: CPT

## 2020-04-10 PROCEDURE — 99024 POSTOP FOLLOW-UP VISIT: CPT | Performed by: ORTHOPAEDIC SURGERY

## 2020-04-10 PROCEDURE — 94799 UNLISTED PULMONARY SVC/PX: CPT

## 2020-04-10 PROCEDURE — 99238 HOSP IP/OBS DSCHRG MGMT 30/<: CPT | Performed by: HOSPITALIST

## 2020-04-10 PROCEDURE — 85018 HEMOGLOBIN: CPT | Performed by: ORTHOPAEDIC SURGERY

## 2020-04-10 PROCEDURE — 25010000003 CEFAZOLIN SODIUM-DEXTROSE 2-3 GM-%(50ML) RECONSTITUTED SOLUTION: Performed by: ORTHOPAEDIC SURGERY

## 2020-04-10 PROCEDURE — 97116 GAIT TRAINING THERAPY: CPT

## 2020-04-10 PROCEDURE — 94770: CPT

## 2020-04-10 PROCEDURE — 94760 N-INVAS EAR/PLS OXIMETRY 1: CPT

## 2020-04-10 RX ORDER — OXYCODONE AND ACETAMINOPHEN 7.5; 325 MG/1; MG/1
2 TABLET ORAL EVERY 4 HOURS PRN
Qty: 42 TABLET | Refills: 0 | Status: SHIPPED | OUTPATIENT
Start: 2020-04-10 | End: 2020-04-13 | Stop reason: SDUPTHER

## 2020-04-10 RX ADMIN — CEFAZOLIN SODIUM 2 G: 2 SOLUTION INTRAVENOUS at 02:15

## 2020-04-10 RX ADMIN — OXYCODONE HYDROCHLORIDE AND ACETAMINOPHEN 2 TABLET: 7.5; 325 TABLET ORAL at 12:23

## 2020-04-10 RX ADMIN — LEVOTHYROXINE SODIUM 125 MCG: 125 TABLET ORAL at 08:38

## 2020-04-10 RX ADMIN — OXYCODONE HYDROCHLORIDE AND ACETAMINOPHEN 2 TABLET: 7.5; 325 TABLET ORAL at 08:38

## 2020-04-10 RX ADMIN — ATORVASTATIN CALCIUM 20 MG: 20 TABLET, FILM COATED ORAL at 08:38

## 2020-04-10 RX ADMIN — ASPIRIN 325 MG: 325 TABLET, COATED ORAL at 08:38

## 2020-04-10 RX ADMIN — GABAPENTIN 300 MG: 300 CAPSULE ORAL at 08:38

## 2020-04-10 RX ADMIN — Medication 1 TABLET: at 08:38

## 2020-04-10 RX ADMIN — CEFAZOLIN SODIUM 2 G: 2 SOLUTION INTRAVENOUS at 08:41

## 2020-04-10 RX ADMIN — DOCUSATE SODIUM 100 MG: 100 CAPSULE, LIQUID FILLED ORAL at 08:37

## 2020-04-10 NOTE — NURSING NOTE
Case Management Discharge Note      Final Note: Discharged home.    Provided Post Acute Provider List?: Yes  Post Acute Provider List: Home Health  Delivered To: Patient  Method of Delivery: In person    Destination      No service has been selected for the patient.      Durable Medical Equipment      No service has been selected for the patient.      Dialysis/Infusion      No service has been selected for the patient.      Home Medical Care      No service has been selected for the patient.      Therapy      No service has been selected for the patient.      Community Resources      No service has been selected for the patient.             Final Discharge Disposition Code: 01 - home or self-care

## 2020-04-10 NOTE — DISCHARGE SUMMARY
Charlie Solano  1960  [id number]    Hospitalists Discharge Summary    Date of Admission: 4/8/2020  Date of Discharge:  4/10/2020    Primary Discharge Diagnoses:  1.  Right Intertrochanteric Hip Fracture    Secondary Discharge Diagnoses:  1.  Hypothyroidism  2.  Dyslipidemia  3.  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 Cobian  Labs ok  CT of hip.     Med surg/ no tele  Obs  Body mass index is 33.59 kg/m².  Falls risk    Hospital Course:  Mr. Solano was admitted to the Med/Surg unit.  He was seen in consultation by Dr. Wilcox and taken to the OR for ORIF.  No post-op complications, and pain was well-controlled w/ oral pain medicine.  He progressed well w/ PT.    PCP  Patient Care Team:  Kvng Hernandez MD as PCP - General (Family Medicine)    Consults:   Consults     Date and Time Order Name Status Description    4/8/2020 1543 Inpatient Orthopedic Surgery Consult            Operations and Procedures Performed:  Procedure(s):  HIP COMPRESSION SCREW-Zhanna compression plate     Xr Chest 1 View    Result Date: 4/8/2020  Narrative: Chest x-ray portable INDICATION: Right hip fracture, preop for surgery in a.m. FINDINGS: Single frontal portable view the chest timed 4/8/2020 is submitted for review. Comparison chest x-ray is from 12/25/2019. There is a small amount of linear airspace disease/atelectasis at the left base. Lungs are otherwise clear. The right perihilar opacity noted previously has resolved. There is no congestive failure. There is no pleural effusion or pneumothorax. The heart size is top normal.     Impression: 1. Small amount of linear airspace disease or atelectasis left base, otherwise no active disease. Signer Name: Sabrina Hudson MD  Signed: 4/8/2020 3:20 PM  Workstation Name: [email or web address]  [facility]    Ct Lower Extremity Right Without Contrast    Result Date: 4/8/2020  Narrative: CT LE RT WO INDICATION:  60-year-old male with a history of intertrochanteric hip fracture. 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 Fell today and has severe right limb and back pain. TECHNIQUE: CT of the right lower extremity without IV contrast. Coronal and sagittal reconstructions were obtained.  Radiation dose reduction techniques included automated exposure control or exposure modulation based on body size. Count of known CT and cardiac nuc med studies performed in previous 12 months: 1.  COMPARISON:  None available. FINDINGS: The included bowel is unremarkable and the appendix is normal. The bladder and prostate gland appear within normal limits. There is no inguinal adenopathy or drainable fluid collection. 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 There is no evidence of acetabular fracture. There is a complete oblique intertrochanteric fracture of the right hip. The fracture line is best demonstrated on the coronal reformats. There is mild medial displacement of the lesser trochanter medially and anteriorly a distance of about 9 mm. No evidence of hip dislocation. There is edematous change of the thigh musculature on the right related to the fracture and displacement of the lesser trochanter.     Impression: 1. Complete intertrochanteric fracture of the right hip with displacement of the lesser trochanter medially and anteriorly a distance of up to about 9 mm. Edematous change of the anterior thigh musculature related to the fracture. 2. No evidence of dislocation or distinct acetabular fracture. 3. 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 Signer Name:  Tim Keen MD  Signed: 4/8/2020 3:36 PM  Workstation Name: [id number]  [facility]    Xr Spine Lumbar Complete 4+vw    Result Date: 4/8/2020  Narrative: XR SPINE LUMBAR COMPLETE 4+VW-: 4/8/2020 12:52 PM  INDICATION: 60-year-old male with a history of osteosarcoma and right leg amputation in 1989. Fell today and has severe right mammogram and back pain.  COMPARISON: None available.  FINDINGS: 5 views of the lumbar spine. Vertebral body heights and alignment are preserved. No acute fracture. No pars defect. No significant degenerative change..      Impression: Negative lumbar spine.  This report was finalized on 4/8/2020 1:33 PM by Dr. Tim Keen MD.      Xr Hip With Or Without Pelvis 2 - 3 View Right    Result Date: 4/9/2020  Narrative: FLUOROSCOPY DURING RIGHT HIP INTRAOPERATIVE PINNING PROCEDURE, 04/09/2020  HISTORY: Intertrochanteric fracture of the right hip yesterday  REPORT: C-arm fluoroscopy was provided by radiology personnel in the OR during surgical pinning of a right hip fracture. Fluoroscopy time was recorded as 246 seconds. 5 spot film images were recorded for documentation purposes. Please see operative note for details.  This report was finalized on 4/9/2020 11:23 AM by Dr. Tim Keen MD.      Xr Hip With Or Without Pelvis 2 - 3 View Right    Result Date: 4/8/2020  Narrative: XR HIP W OR WO PELVIS 2-3 VIEW RIGHT-: 4/8/2020 12:53 PM  INDICATION: 60-year-old male with a history of right leg amputation in 1989 suffering a fall today. Severe right limb and back pain .  COMPARISON: None available.  FINDINGS: AP and frog-leg lateral view(s) of the right hip.  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 There is an oblique likely complete intertrochanteric fracture of the right hip. There is displacement of the lesser trochanter medially a distance of about 7 mm.  Status post amputation of the right femur  distal to the proximal third shaft.       Impression:  1. Oblique and trochanteric fracture of the right hip, likely complete in nature. Mild displacement of the lesser trochanter medially 7 mm.  This report was finalized on 4/8/2020 1:31 PM by Dr. Tim Keen MD.        Allergies:  is allergic to latex.    Mehul  reviewed    Discharge Medications:     Discharge Medications      New Medications      Instructions Start Date   oxyCODONE-acetaminophen 7.5-325 MG per tablet  Commonly known as:  PERCOCET   2 tablets, Oral, Every 4 Hours PRN         Changes to Medications      Instructions Start Date   aspirin 325 MG EC tablet  What changed:    · medication strength  · how much to take   325 mg, Oral, Daily   Start Date:  April 11, 2020     gabapentin 300 MG capsule  Commonly known as:  NEURONTIN  What changed:    · when to take this  · reasons to take this   300 mg, Oral, 3 Times Daily         Continue These Medications      Instructions Start Date   CBD oil capsule  Commonly known as:  cannabidiol   1 capsule, Oral, Daily      levothyroxine 125 MCG tablet  Commonly known as:  SYNTHROID, LEVOTHROID   125 mcg, Oral, Daily      loratadine 10 MG tablet  Commonly known as:  CLARITIN   10 mg, Oral, Daily      LORazepam 1 MG tablet  Commonly known as:  ATIVAN   1 mg, Oral, 2 Times Daily PRN      multivitamin with minerals tablet tablet   1 tablet, Oral, Daily      simvastatin 40 MG tablet  Commonly known as:  ZOCOR   40 mg, Oral, Nightly      vitamin D 1.25 MG (68788 UT) capsule capsule  Commonly known as:  ERGOCALCIFEROL   50,000 Units, Oral, Daily      vitamin E 1000 UNIT capsule   1,000 Units, Oral, Daily         Stop These Medications    HYDROcodone-acetaminophen 5-325 MG per tablet  Commonly known as:  NORCO            Last Lab Results:   Lab Results (most recent)     Procedure Component Value Units Date/Time    Hemoglobin & Hematocrit, Blood [[id number]]  (Abnormal) Collected:  04/10/20 0448    Specimen:  Blood  Updated:  04/10/20 0522     Hemoglobin 12.5 g/dL      Hematocrit 37.5 %     T4, Free [[id number]]  (Normal) Collected:  04/08/20 1336    Specimen:  Blood Updated:  04/08/20 1524     Free T4 1.53 ng/dL     Narrative:       Results may be falsely increased if patient taking Biotin.      TSH [[id number]]  (Normal) Collected:  04/08/20 1336    Specimen:  Blood Updated:  04/08/20 1524     TSH 1.240 uIU/mL     [facility] [[id number]] Collected:  04/08/20 1336    Specimen:  Blood Updated:  04/08/20 1446    Narrative:       The following orders were created for panel order [facility].  Procedure                               Abnormality         Status                     ---------                               -----------         ------                     Light Blue Top[[id number]]                                   Final result               Green Top (Gel)[[id number]]                                  Final result               Lavender Top[[id number]]                                     Final result               Gold Top - SST[[id number]]                                   Final result                 Please view results for these tests on the individual orders.    Light Blue Top [[id number]] Collected:  04/08/20 1336    Specimen:  Blood Updated:  04/08/20 1446     Extra Tube hold for add-on     Comment: Auto resulted       Green Top (Gel) [[id number]] Collected:  04/08/20 1336    Specimen:  Blood Updated:  04/08/20 1446     Extra Tube Hold for add-ons.     Comment: Auto resulted.       Lavender Top [[id number]] Collected:  04/08/20 1336    Specimen:  Blood Updated:  04/08/20 1446     Extra Tube hold for add-on     Comment: Auto resulted       Gold Top - SST [[id number]] Collected:  04/08/20 1336    Specimen:  Blood Updated:  04/08/20 1446     Extra Tube Hold for add-ons.     Comment: Auto resulted.       Comprehensive Metabolic Panel [[id number]]  (Abnormal) Collected:  04/08/20 1336    Specimen:  Blood Updated:  04/08/20 1406        Glucose 100 mg/dL      BUN 15 mg/dL      Creatinine 1.21 mg/dL      Sodium 137 mmol/L      Potassium 4.1 mmol/L      Chloride 105 mmol/L      CO2 20.0 mmol/L      Calcium 8.9 mg/dL      Total Protein 6.6 g/dL      Albumin 3.80 g/dL      ALT (SGPT) 48 U/L      AST (SGOT) 34 U/L      Comment: Specimen hemolyzed.  Results may be affected.        Alkaline Phosphatase 81 U/L      Total Bilirubin 1.2 mg/dL      eGFR Non African Amer 62 mL/min/1.73      Globulin 2.8 gm/dL      A/G Ratio 1.4 g/dL      BUN/Creatinine Ratio 12.4     Anion Gap 12.0 mmol/L     Narrative:       GFR Normal >60  Chronic Kidney Disease <60  Kidney Failure <15      CBC & Differential [[id number]] Collected:  04/08/20 1336    Specimen:  Blood Updated:  04/08/20 1353    Narrative:       The following orders were created for panel order CBC & Differential.  Procedure                               Abnormality         Status                     ---------                               -----------         ------                     CBC Auto Differential[[id number]]        Abnormal            Final result                 Please view results for these tests on the individual orders.    CBC Auto Differential [[id number]]  (Abnormal) Collected:  04/08/20 1336    Specimen:  Blood Updated:  04/08/20 1353     WBC 8.37 10*3/mm3      RBC 5.08 10*6/mm3      Hemoglobin 16.2 g/dL      Hematocrit 47.7 %      MCV 93.9 fL      MCH 31.9 pg      MCHC 34.0 g/dL      RDW 11.8 %      RDW-SD 40.8 fl      MPV 9.8 fL      Platelets 263 10*3/mm3      Neutrophil % 73.3 %      Lymphocyte % 15.7 %      Monocyte % 7.5 %      Eosinophil % 2.6 %      Basophil % 0.5 %      Immature Grans % 0.4 %      Neutrophils, Absolute 6.14 10*3/mm3      Lymphocytes, Absolute 1.31 10*3/mm3      Monocytes, Absolute 0.63 10*3/mm3      Eosinophils, Absolute 0.22 10*3/mm3      Basophils, Absolute 0.04 10*3/mm3      Immature Grans, Absolute 0.03 10*3/mm3         Imaging Results (Most Recent)      Procedure Component Value Units Date/Time    FL C Arm During Surgery [[id number]] Resulted:  04/09/20 1258     Updated:  04/09/20 1258    XR Hip With or Without Pelvis 2 - 3 View Right [[id number]] Collected:  04/09/20 1122     Updated:  04/09/20 1125    Narrative:       FLUOROSCOPY DURING RIGHT HIP INTRAOPERATIVE PINNING PROCEDURE,  04/09/2020     HISTORY:  Intertrochanteric fracture of the right hip yesterday     REPORT:  C-arm fluoroscopy was provided by radiology personnel in the OR during  surgical pinning of a right hip fracture. Fluoroscopy time was recorded  as 246 seconds. 5 spot film images were recorded for documentation  purposes. Please see operative note for details.     This report was finalized on 4/9/2020 11:23 AM by Dr. Tim Keen MD.       CT Lower Extremity Right Without Contrast [[id number]] Collected:  04/08/20 1536     Updated:  04/08/20 1539    Narrative:       CT LE RT WO    INDICATION:    60-year-old male with a history of intertrochanteric hip fracture. 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 Fell today and has severe right limb and back pain.    TECHNIQUE:   CT of the right lower extremity without IV contrast. Coronal and sagittal reconstructions were obtained.  Radiation dose reduction techniques included automated exposure control or exposure modulation based on body size. Count of known CT and cardiac nuc  med studies performed in previous 12 months: 1.      COMPARISON:    None available.    FINDINGS:  The included bowel is unremarkable and the appendix is normal. The bladder and prostate gland appear within normal limits. There is no inguinal adenopathy or drainable fluid collection.    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 There is no  evidence of  acetabular fracture. There is a complete oblique intertrochanteric fracture of the right hip. The fracture line is best demonstrated on the coronal reformats. There is mild medial displacement of the lesser trochanter medially and anteriorly  a distance of about 9 mm. No evidence of hip dislocation. There is edematous change of the thigh musculature on the right related to the fracture and displacement of the lesser trochanter.      Impression:         1. Complete intertrochanteric fracture of the right hip with displacement of the lesser trochanter medially and anteriorly a distance of up to about 9 mm. Edematous change of the anterior thigh musculature related to the fracture.  2. No evidence of dislocation or distinct acetabular fracture.  3. 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    Signer Name: Tim Keen MD   Signed: 4/8/2020 3:36 PM   Workstation Name: [id number]    [facility]    XR Chest 1 View [[id number]] Collected:  04/08/20 1520     Updated:  04/08/20 1526    Narrative:       Chest x-ray portable    INDICATION: Right hip fracture, preop for surgery in a.m.    FINDINGS: Single frontal portable view the chest timed 4/8/2020 is submitted for review. Comparison chest x-ray is from 12/25/2019. There is a small amount of linear airspace disease/atelectasis at the left base. Lungs are otherwise clear. The right  perihilar opacity noted previously has resolved. There is no congestive failure. There is no pleural effusion or pneumothorax. The heart size is top normal.      Impression:       1. Small amount of linear airspace disease or atelectasis left base, otherwise no active disease.    Signer Name: Sabrina Hudson MD   Signed: 4/8/2020 3:20 PM   Workstation Name: [email or web address]    [facility]    XR Spine Lumbar Complete 4+VW [[id number]] Collected:  04/08/20 1332     Updated:  04/08/20 1335    Narrative:       XR SPINE LUMBAR  COMPLETE 4+VW-: 4/8/2020 12:52 PM     INDICATION:   60-year-old male with a history of osteosarcoma and right leg amputation  in 1989. Fell today and has severe right mammogram and back pain.     COMPARISON:   None available.     FINDINGS:  5 views of the lumbar spine. Vertebral body heights and alignment are  preserved. No acute fracture. No pars defect. No significant  degenerative change..       Impression:       Negative lumbar spine.     This report was finalized on 4/8/2020 1:33 PM by Dr. Tim Keen MD.       XR Hip With or Without Pelvis 2 - 3 View Right [[id number]] Collected:  04/08/20 1328     Updated:  04/08/20 1334    Narrative:       XR HIP W OR WO PELVIS 2-3 VIEW RIGHT-: 4/8/2020 12:53 PM     INDICATION:   60-year-old male with a history of right leg amputation in 1989  suffering a fall today. Severe right limb and back pain .     COMPARISON:   None available.     FINDINGS:  AP and frog-leg lateral view(s) of the right hip.  There is asymmetric  osteoporosis of the right hemipelvis and right hip, likely related to  osteoporosis of disuse. There is an oblique likely complete  intertrochanteric fracture of the right hip. There is displacement of  the lesser trochanter medially a distance of about 7 mm.  Status post  amputation of the right femur distal to the proximal third shaft.         Impression:          1. Oblique and trochanteric fracture of the right hip, likely complete  in nature. Mild displacement of the lesser trochanter medially 7 mm.     This report was finalized on 4/8/2020 1:31 PM by Dr. Tim Keen MD.             PROCEDURES  Procedure(s):  HIP COMPRESSION SCREW-Zhanna compression plate    Condition on Discharge:  Stable    Physical Exam at Discharge  Vital Signs  Temp:  [97.6 °F (36.4 °C)-99 °F (37.2 °C)] 97.6 °F (36.4 °C)  Heart Rate:  [78-98] 78  Resp:  [11-22] 18  BP: (103-151)/(60-83) 131/65    Physical Exam:  Physical Exam   Constitutional: Patient appears well-developed and  well-nourished and in no acute distress.  Appears younger than stated age.   Cardiovascular: Regular rate, regular rhythm, S1 normal and S2 normal.  Exam reveals no gallop and no friction rub.  No murmur heard.  Pulmonary/Chest: Lungs are clear to auscultation bilaterally. No respiratory distress. No wheezes. No rhonchi. No rales.   Abdominal: Obese. Soft. Bowel sounds are normal. There is no tenderness.   Musculoskeletal: Normal Muscle tone  Extremities: No edema.   Neurological: Cranial nerves II-XII are grossly intact with no focal deficits.    Discharge Disposition  Home    Visiting Nurse:    No     Home PT/OT:  Yes     Home Safety Evaluation:  Yes     DME  Rol[facility]    Discharge Diet:      Dietary Orders (From admission, onward)     Start     Ordered    04/09/20 1321  Diet Regular  Diet Effective Now     Question:  Diet Texture / Consistency  Answer:  Regular    04/09/20 1320                Activity at Discharge:  As tolerated      Follow-up Appointments  Future Appointments   Date Time Provider Department Center   4/22/2020  8:30 AM Jorge Wilcox MD [facility] None     Additional Instructions for the Follow-ups that You Need to Schedule     Discharge Follow-up with PCP   As directed       Currently Documented PCP:    Kvng Hernandez MD    PCP Phone Number:    [phone]     Follow Up Details:  2 weeks         Discharge Follow-up with Specified Provider: Dr. Wilcox as scheduled.   As directed      To:  Dr. Wilcox as scheduled.         Discharge Follow-up with Specified Provider: Brenden; 2 Weeks   As directed      To:  Brenden    Follow Up:  2 Weeks               Test Results Pending at Discharge  None     Sergio Mondragon MD  04/10/20  11:46

## 2020-04-10 NOTE — PLAN OF CARE
Problem: Patient Care Overview  Goal: Plan of Care Review  Flowsheets  Taken 4/10/2020 1124  Plan of Care Reviewed With: patient  Taken 4/10/2020 0996  Outcome Summary: PT: Patient performs supine to sit transfer with supervision, sit to/from stand transfers with supervision and gait x 114 feet with supervision with use of FWW. Patient demonstrates safe use of AD without loss of balance. Patient has no concerns regarding stair training and politely refuses need. Patient has been ascending/descending stairs with crutches without prosethesis x 2 years.  Patient has met all goals and does not have any concerns for return home. Recommend continued use of FWW. No further inpatient skilled PT needs at this time. Will discharge from therapy.

## 2020-04-10 NOTE — PLAN OF CARE
Problem: Patient Care Overview  Goal: Plan of Care Review  Outcome: Outcome(s) achieved  Flowsheets (Taken 4/10/2020 5362)  Progress: improving  Outcome Summary: discharge home

## 2020-04-10 NOTE — THERAPY DISCHARGE NOTE
Acute Care - Occupational Therapy Treatment Note/Discharge   [place]     Patient Name: Charlie Solano  : 1960  MRN: [id number]  Today's Date: 4/10/2020  Onset of Illness/Injury or Date of Surgery: 20  Date of Referral to OT: 20  Referring Physician: Dr. Wilcox      Admit Date: 2020    Visit Dx:     ICD-10-CM ICD-9-CM   1. Closed nondisplaced intertrochanteric fracture of right femur, initial encounter (CMS/HCC) S72.144A 820.21   2. Closed displaced intertrochanteric fracture of right femur, initial encounter (CMS/HCC) S72.141A 820.21     Patient Active Problem List   Diagnosis   • Intertrochanteric fracture of right hip (CMS/[facility])   • Closed nondisplaced intertrochanteric fracture of right femur (CMS/HCC)       Therapy Treatment    Rehabilitation Treatment Summary     Row Name 04/10/20 0950 04/10/20 0948          Treatment Time/Intention    Discipline  physical therapist  -BP  occupational therapist  -EN     Document Type  discharge treatment  -BP  discharge treatment  -EN     Subjective Information  no complaints  -BP  no complaints  -EN     Mode of Treatment  physical therapy  -BP  occupational therapy  -EN     Patient/Family Observations  Patient supine in bed with HOB elevated. Patient agreeable to PT treatment  -BP  Patient reclined in bed, agreed to OT.  -EN     Care Plan Review  risks/benefits reviewed  -BP  patient/other agree to care plan;care plan/treatment goals reviewed  -EN     Patient Effort  good  -BP  good  -EN     Existing Precautions/Restrictions  fall  -BP  fall  -EN     Patient Response to Treatment  Patient has no questions or concerns regarding return home. Plan to discharge patient from PT at this time.   -BP  Patient reports no concerns regarding return home.   -EN     Recorded by [BP] Darius Lockhart, PT 04/10/20 1123 [EN] Era Lynn OTR 04/10/20 1240     Row Name 04/10/20 0971             Cognitive Assessment/Intervention    Additional Documentation   Cognitive Assessment/Intervention (Group)  -BP      Recorded by [BP] Darius Lockhart, PT 04/10/20 1123      Row Name 04/10/20 0950 04/10/20 0948          Cognitive Assessment/Intervention- PT/OT    Personal Safety Interventions  gait belt;nonskid shoes/slippers when out of bed  -BP  gait belt;nonskid shoes/slippers when out of bed  -EN     Recorded by [BP] Darius Lockhart, PT 04/10/20 1123 [EN] Era Lynn, OTR 04/10/20 1240     Row Name 04/10/20 0950 04/10/20 0948          Bed Mobility Assessment/Treatment    Bed Mobility Assessment/Treatment  supine-sit  -BP  supine-sit  -EN     Supine-Sit [place] (Bed Mobility)  supervision  -BP  supervision  -EN     Assistive Device (Bed Mobility)  head of bed elevated  -BP  head of bed elevated  -EN     Recorded by [BP] Darius Lockhart, PT 04/10/20 1123 [EN] Era Lynn, OTR 04/10/20 1240     Row Name 04/10/20 0950 04/10/20 0948          Transfer Assessment/Treatment    Transfer Assessment/Treatment  sit-stand transfer;stand-sit transfer  -BP  sit-stand transfer;stand-sit transfer  -EN     Comment (Transfers)  Patient demonstrates proper hand placement during transfers. Performed sit to/from stand transfers x 2.   -BP  --     Recorded by [BP] Darius Lockhart, PT 04/10/20 1123 [EN] Era Lynn, OTR 04/10/20 1240     Row Name 04/10/20 0950 04/10/20 0948          Sit-Stand Transfer    Sit-Stand [place] (Transfers)  supervision  -BP  supervision  -EN     Assistive Device (Sit-Stand Transfers)  walker, front-wheeled  -BP  walker, front-wheeled  -EN     Recorded by [BP] Darius Lockhart, PT 04/10/20 1123 [EN] Era Lynn, OTR 04/10/20 1240     Row Name 04/10/20 0950 04/10/20 0948          Stand-Sit Transfer    Stand-Sit [place] (Transfers)  supervision  -BP  supervision  -EN     Assistive Device (Stand-Sit Transfers)  walker, front-wheeled  -BP  walker, front-wheeled  -EN     Recorded by [BP] Darius Lockhart, PT 04/10/20  1123 [EN] Era Lynn, OTR 04/10/20 1240     Row Name 04/10/20 0950             Gait/Stairs Assessment/Training    [place] Level (Gait)  supervision  -BP      Assistive Device (Gait)  walker, front-wheeled  -BP      Distance in Feet (Gait)  114  -BP      Deviations/Abnormal Patterns (Gait)  gait speed decreased  -BP      Comment (Gait/Stairs)  Patient demonstrates safe use of AD during gait. No loss of balance noted. Patient has no concerns regarding stair training and politely refuses need for stair training.   -BP      Recorded by [BP] Darius Lockhart, PT 04/10/20 1123      Row Name 04/10/20 0948             ADL Assessment/Intervention    BADL Assessment/Intervention  lower body dressing  -EN      Recorded by [EN] Era Lynn, OTR 04/10/20 1240      Row Name 04/10/20 0948             Lower Body Dressing Assessment/Training    Lower Body Dressing [place] Level  don;undergarment;supervision;shoes/slippers;independent  -EN      Comment (Lower Body Dressing)  Donned underwear with supervision, donned sock independently  -EN      Recorded by [EN] Era Lynn, OTR 04/10/20 1240      Row Name 04/10/20 0948             Positioning and Restraints    Pre-Treatment Position  in bed  -EN      Post Treatment Position  other with P.T.  -EN      Recorded by [EN] Era Lynn, OTR 04/10/20 1240      Row Name 04/10/20 0950             Pain Assessment    Additional Documentation  Pain Scale: Numbers Pre/Post-Treatment (Group)  -BP      Recorded by [BP] Darius Lockhart, PT 04/10/20 1123      Row Name 04/10/20 0950 04/10/20 0948          Pain Scale: Numbers Pre/Post-Treatment    Pre/Post Treatment Pain Comment  Patient does not complain of pain   -BP  No c/o pain at rest or during ADL activity  -EN     Recorded by [BP] Darius Lockhart, PT 04/10/20 1123 [EN] Era Lynn, OTR 04/10/20 1240     Row Name                Residual Limb Assessment 04/08/20 5805 transfemoral (above  knee), right    Residual Limb Assessment - Properties Group Date first assessed: 04/08/20 [MB] Time first assessed: 2215 [MB] Location: transfemoral (above knee), right [MB] Additional Comments: was amputated 31 yrs. ago [MB] Recorded by:  [MB] Lisa Krishnan RN 04/08/20 2216    Row Name                Wound 04/09/20 1050 Right anterior hip Incision    Wound - Properties Group Date first assessed: 04/09/20 [ZANE] Time first assessed: 1050 [ZANE] Present on Hospital Admission: N [ZANE] Side: Right [ZANE] Orientation: anterior [ZANE] Location: hip [ZANE] Primary Wound Type: Incision [ZANE] Number of Sutures Placed: 12 [ZANE] Recorded by:  [ZANE] Natalie Moon RN 04/09/20 1051    Row Name 04/10/20 0950 04/10/20 0948          Plan of Care Review    Plan of Care Reviewed With  patient  -BP  patient  -EN     Outcome Summary  PT: Patient performs supine to sit transfer with supervision, sit to/from stand transfers with supervision and gait x 114 feet with supervision with use of FWW. Patient demonstrates safe use of AD without loss of balance. Patient has no concerns regarding stair training and politely refuses need. Patient has been ascending/descending stairs with crutches without prosethesis x 2 years.  Patient has met all goals and does not have any concerns for return home. Recommend continued use of FWW. No further inpatient skilled PT needs at this time. Will discharge from therapy.   -BP  OT- Performed supine to sit with supervision, sit to stand with supervision and donned underwear with supervision. Patient issued reacher for improved safety during ADL/IADL routine. Patient doing well and has no concerns regarding ADLs, no further skilled OT needs at this time.  -EN     Recorded by [BP] Darius Lockhart, PT 04/10/20 1123 [EN] Era Lynn OTR 04/10/20 1240     Row Name 04/10/20 0948             Outcome Summary/Treatment Plan (OT)    Daily Summary of Progress (OT)  prepare for discharge  -EN      Anticipated  Equipment Needs at Discharge (OT)  front wheeled walker reacher  -EN      Anticipated Discharge Disposition (OT)  home;other (see comments) with spouse  -EN      Reason for Discharge (OT Discharge Summary)  no further needs identified  -EN      Recorded by [EN] Era Lynn, OTR 04/10/20 1240      Row Name 04/10/20 0950             Outcome Summary/Treatment Plan (PT)    Daily Summary of Progress (PT)  prepare for discharge  -BP      Reason for Discharge (PT Discharge Summary)  no further needs identified;patient met all goals and outcomes  -BP      Recorded by [BP] Darius Lockhart, PT 04/10/20 1123        User Key  (r) = Recorded By, (t) = Taken By, (c) = Cosigned By    Initials Name Effective Dates Discipline    ZANE Natalie Moon RN 06/16/16 -  Nurse    Era Louise, OTR 06/22/16 -  OT    Lisa Romero RN 05/10/19 -  Nurse    BP Darius Lockhart, PT 04/03/18 -  PT        Wound 04/09/20 1050 Right anterior hip Incision (Active)   Dressing Appearance dry;intact 4/10/2020  8:37 AM   Base dressing in place, unable to visualize 4/9/2020  8:58 PM   Periwound pink 4/9/2020  1:20 PM   Drainage Amount none 4/9/2020  8:58 PM       Rehab Goal Summary     Row Name 04/10/20 0950 04/10/20 0948          Bed Mobility Goal 1 (PT)    Activity/Assistive Device (Bed Mobility Goal 1, PT)  bed mobility activities, all  -BP  --     [place] Level/Cues Needed (Bed Mobility Goal 1, PT)  supervision required  -BP  --     Time Frame (Bed Mobility Goal 1, PT)  2 days  -BP  --     Progress/Outcomes (Bed Mobility Goal 1, PT)  goal met  -BP  --        Transfer Goal 1 (PT)    Activity/Assistive Device (Transfer Goal 1, PT)  sit-to-stand/stand-to-sit;walker, rolling  -BP  --     [place] Level/Cues Needed (Transfer Goal 1, PT)  supervision required  -BP  --     Time Frame (Transfer Goal 1, PT)  2 days  -BP  --     Progress/Outcome (Transfer Goal 1, PT)  goal met  -BP  --        Gait Training Goal 1 (PT)     Activity/Assistive Device (Gait Training Goal 1, PT)  gait (walking locomotion);walker, rolling  -BP  --     [place] Level (Gait Training Goal 1, PT)  supervision required  -BP  --     Distance (Gait Goal 1, PT)  50  -BP  --     Time Frame (Gait Training Goal 1, PT)  2 days  -BP  --     Progress/Outcome (Gait Training Goal 1, PT)  goal met  -BP  --        Transfer Goal 1 (OT)    Activity/Assistive Device (Transfer Goal 1, OT)  --  sit-to-stand/stand-to-sit;toilet;walker, rolling;commode, bedside without drop arms  -EN     [place] Level/Cues Needed (Transfer Goal 1, OT)  --  supervision required  -EN     Time Frame (Transfer Goal 1, OT)  --  by discharge  -EN     Progress/Outcome (Transfer Goal 1, OT)  --  goal met  -EN        Dressing Goal 1 (OT)    Activity/Assistive Device (Dressing Goal 1, OT)  --  lower body dressing;other (see comments) LH AE if needed  -EN     [place]/Cues Needed (Dressing Goal 1, OT)  --  supervision required  -EN     Time Frame (Dressing Goal 1, OT)  --  by discharge  -EN     Progress/Outcome (Dressing Goal 1, OT)  --  goal met  -EN       User Key  (r) = Recorded By, (t) = Taken By, (c) = Cosigned By    Initials Name Provider Type Discipline    EN Era Lynn, OTR Occupational Therapist OT    BP Darius Lockhart, PT Physical Therapist PT              OT Recommendation and Plan  Outcome Summary/Treatment Plan (OT)  Daily Summary of Progress (OT): prepare for discharge  Anticipated Equipment Needs at Discharge (OT): front wheeled walker(reacher)  Anticipated Discharge Disposition (OT): home, other (see comments)  Reason for Discharge (OT Discharge Summary): no further needs identified  Planned Therapy Interventions (OT Eval): adaptive equipment training, BADL retraining, transfer/mobility retraining  Therapy Frequency (OT Eval): other (see comments)(1-2 visits)  Daily Summary of Progress (OT): prepare for discharge  Plan of Care Review  Plan of Care Reviewed With:  patient  Plan of Care Reviewed With: patient  Outcome Summary: OT- Performed supine to sit with supervision, sit to stand with supervision and donned underwear with supervision. Patient issued reacher for improved safety during ADL/IADL routine. Patient doing well and has no concerns regarding ADLs, no further skilled OT needs at this time.    Outcome Measures     Row Name 04/10/20 0950 04/10/20 0948 04/09/20 1500       How much help from another person do you currently need...    Turning from your back to your side while in flat bed without using bedrails?  3  -BP  --  --    Moving from lying on back to sitting on the side of a flat bed without bedrails?  3  -BP  --  --    Moving to and from a bed to a chair (including a wheelchair)?  3  -BP  --  --    Standing up from a chair using your arms (e.g., wheelchair, bedside chair)?  3  -BP  --  --    Climbing 3-5 steps with a railing?  3  -BP  --  --    To walk in hospital room?  3  -BP  --  --    AM-PAC 6 Clicks Score (PT)  18  -BP  --  --       How much help from another is currently needed...    Putting on and taking off regular lower body clothing?  --  3  -EN  --    Bathing (including washing, rinsing, and drying)  --  3  -EN  --    Toileting (which includes using toilet bed pan or urinal)  --  3  -EN  --    Putting on and taking off regular upper body clothing  --  4  -EN  --    Taking care of personal grooming (such as brushing teeth)  --  4  -EN  --    Eating meals  --  4  -EN  --    AM-PAC 6 Clicks Score (OT)  --  21  -EN  --       Functional Assessment    Outcome Measure Options  AM-PAC 6 Clicks Basic Mobility (PT)  -BP  --  AM-PAC 6 Clicks Daily Activity (OT)  -EN    Row Name 04/09/20 1353 04/09/20 1352          How much help from another person do you currently need...    Turning from your back to your side while in flat bed without using bedrails?  --  3  -BP     Moving from lying on back to sitting on the side of a flat bed without bedrails?  --  3  -BP      Moving to and from a bed to a chair (including a wheelchair)?  --  3  -BP     Standing up from a chair using your arms (e.g., wheelchair, bedside chair)?  --  3  -BP     Climbing 3-5 steps with a railing?  --  3  -BP     To walk in hospital room?  --  3  -BP     AM-PAC 6 Clicks Score (PT)  --  18  -BP        How much help from another is currently needed...    Putting on and taking off regular lower body clothing?  3  -EN  --     Bathing (including washing, rinsing, and drying)  3  -EN  --     Toileting (which includes using toilet bed pan or urinal)  3  -EN  --     Putting on and taking off regular upper body clothing  4  -EN  --     Taking care of personal grooming (such as brushing teeth)  4  -EN  --     Eating meals  4  -EN  --     AM-PAC 6 Clicks Score (OT)  21  -EN  --        Functional Assessment    Outcome Measure Options  --  AM-PAC 6 Clicks Basic Mobility (PT)  -BP       User Key  (r) = Recorded By, (t) = Taken By, (c) = Cosigned By    Initials Name Provider Type    Era Louise OTR Occupational Therapist    Darius Joshua, PT Physical Therapist           Time Calculation:    Time Calculation- OT     Row Name 04/10/20 1246 04/10/20 1126          Time Calculation- OT    OT Start Time  0948  -EN  --     OT Stop Time  0957  -EN  --     OT Time Calculation (min)  9 min  -EN  --        Timed Charges    75310 - Gait Training Minutes   --  19  -BP     91061 - OT Therapeutic Activity Minutes  9  -EN  --       User Key  (r) = Recorded By, (t) = Taken By, (c) = Cosigned By    Initials Name Provider Type    Era Louise OTR Occupational Therapist    Darius Joshua, PT Physical Therapist        Therapy Suggested Charges     Code   Minutes Charges    92093 (CPT®) Hc Ot Neuromusc Re Education Ea 15 Min      05710 (CPT®) Hc Ot Ther Proc Ea 15 Min      55428 (CPT®) Hc Ot Therapeutic Act Ea 15 Min 9 1    12266 (CPT®) Hc Ot Manual Therapy Ea 15 Min      00050 (CPT®) Hc Ot Iontophoresis  Ea 15 Min      46838 (CPT®) Hc Ot Elec Stim Ea-Per 15 Min      52726 (CPT®) Hc Ot Ultrasound Ea 15 Min      51214 (CPT®) Hc Ot Self Care/Mgmt/Train Ea 15 Min      Total  9 1        Therapy Charges for Today     Code Description Service Date Service Provider Modifiers Qty    13327577514 HC OT EVAL LOW COMPLEXITY 3 4/9/2020 Era Lynn OTR GO 1    59414690318 HC OT THERAPEUTIC ACT EA 15 MIN 4/10/2020 Era Lynn OTR GO 1               OT Discharge Summary  Anticipated Discharge Disposition (OT): home, other (see comments)  Reason for Discharge: All goals achieved  Discharge Destination: Home    HU Nguyen  4/10/2020

## 2020-04-10 NOTE — NURSING NOTE
Continued Stay Note  [facility]     Patient Name: Charlie Solano  MRN: [id number]  Today's Date: 4/10/2020    Admit Date: 4/8/2020    Discharge Plan     Row Name 04/10/20 0852       Plan    Plan Comments  Followed up with patient this morning regarding discharge plans. Patient is sitting up in bed eating breakfast. Patient states he needs to talk with his wife this morning before choosing a home health agency but he know he will need a rolling walker at discharge. CM will follow up with patient for home health needs later this morning. Patient had no other questions or concerns regarding discharge plans at this time. CM will continue to follow for needs.        Discharge Codes    No documentation.             Elaina Busby RN

## 2020-04-10 NOTE — PLAN OF CARE
Problem: Patient Care Overview  Goal: Plan of Care Review  Outcome: Ongoing (interventions implemented as appropriate)  Flowsheets (Taken 4/10/2020 8002)  Progress: improving  Plan of Care Reviewed With: patient  Outcome Summary: VSS, utilizing a walker to go into the bathroom, pt on room air and wearing home cpap at hs

## 2020-04-10 NOTE — PROGRESS NOTES
Orthopedic Progress Note   Chief Complaint: Status post ORIF right intertrochanteric hip fracture    Subjective     Interval History: Patient is postop day 1 is doing well.  Is afebrile hemodynamically stable hemoglobin of 12.5.  Pain control oral medication.          Objective     Vital Signs  Temp:  [97.6 °F (36.4 °C)-99 °F (37.2 °C)] 97.6 °F (36.4 °C)  Heart Rate:  [66-98] 78  Resp:  [11-23] 18  BP: (103-151)/(60-84) 131/65  Body mass index is 33.66 kg/m².    Intake/Output Summary (Last 24 hours) at 4/10/2020 0709  Last data filed at 4/9/2020 2058  Gross per 24 hour   Intake 1200 ml   Output 350 ml   Net 850 ml     No intake/output data recorded.       Physical Exam:   General: patient awake, alert and cooperative   Cardiovascular: regular rhythm and rate   Pulm: clear to auscultation bilaterally   Abdomen: Benign.  Soft bowel sounds   Extremities: Right hip wound is benign.  Mild swelling noted.  He has had 300 cc of blood in the Hemovac   Neurologic: Normal mood and behavior     Results Review:     I reviewed the patient's new clinical results.      WBC No results found for: WBCS   HGB Hemoglobin   Date Value Ref Range Status   04/10/2020 12.5 (L) 13.0 - 17.7 g/dL Final   04/08/2020 16.2 13.0 - 17.7 g/dL Final      HCT Hematocrit   Date Value Ref Range Status   04/10/2020 37.5 37.5 - 51.0 % Final   04/08/2020 47.7 37.5 - 51.0 % Final      Platlets No results found for: LABPLAT     PT/INR:  No results found for: PROTIME/No results found for: INR    Sodium Sodium   Date Value Ref Range Status   04/08/2020 137 136 - 145 mmol/L Final      Potassium Potassium   Date Value Ref Range Status   04/08/2020 4.1 3.5 - 5.2 mmol/L Final      Chloride Chloride   Date Value Ref Range Status   04/08/2020 105 98 - 107 mmol/L Final      Bicarbonate No results found for: PLASMABICARB   BUN BUN   Date Value Ref Range Status   04/08/2020 15 6 - 20 mg/dL Final      Creatinine Creatinine   Date Value Ref Range Status   04/08/2020 1.21  0.76 - 1.27 mg/dL Final      Calcium Calcium   Date Value Ref Range Status   04/08/2020 8.9 8.6 - 10.5 mg/dL Final      Magnesium  AST  ALT  Bilirubin, Total  AlkPhos  Albumin    Amylase  Lipase    Radiology: No results found for: MG  No components found for: AST.*  No components found for: ALT.*  No results found for: BILIRUBIN    No components found for: ALKPHOS.*  No components found for: ALBUMIN.*      No components found for: AMYLASE.*  No components found for: LIPASE.*            Imaging Results (Most Recent)     Procedure Component Value Units Date/Time    FL C Arm During Surgery [[id number]] Resulted:  04/09/20 1258     Updated:  04/09/20 1258    XR Hip With or Without Pelvis 2 - 3 View Right [[id number]] Collected:  04/09/20 1122     Updated:  04/09/20 1125    Narrative:       FLUOROSCOPY DURING RIGHT HIP INTRAOPERATIVE PINNING PROCEDURE,  04/09/2020     HISTORY:  Intertrochanteric fracture of the right hip yesterday     REPORT:  C-arm fluoroscopy was provided by radiology personnel in the OR during  surgical pinning of a right hip fracture. Fluoroscopy time was recorded  as 246 seconds. 5 spot film images were recorded for documentation  purposes. Please see operative note for details.     This report was finalized on 4/9/2020 11:23 AM by Dr. Tim Keen MD.       CT Lower Extremity Right Without Contrast [[id number]] Collected:  04/08/20 1536     Updated:  04/08/20 1539    Narrative:       CT LE RT WO    INDICATION:    60-year-old male with a history of intertrochanteric hip fracture. 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 Fell today and has severe right limb and back pain.    TECHNIQUE:   CT of the right lower extremity without IV contrast. Coronal and sagittal reconstructions were obtained.  Radiation dose reduction techniques included automated exposure control or exposure modulation based on body size. Count of known CT and cardiac nuc  med studies performed in previous 12  months: 1.      COMPARISON:    None available.    FINDINGS:  The included bowel is unremarkable and the appendix is normal. The bladder and prostate gland appear within normal limits. There is no inguinal adenopathy or drainable fluid collection.    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 There is no  evidence of acetabular fracture. There is a complete oblique intertrochanteric fracture of the right hip. The fracture line is best demonstrated on the coronal reformats. There is mild medial displacement of the lesser trochanter medially and anteriorly  a distance of about 9 mm. No evidence of hip dislocation. There is edematous change of the thigh musculature on the right related to the fracture and displacement of the lesser trochanter.      Impression:         1. Complete intertrochanteric fracture of the right hip with displacement of the lesser trochanter medially and anteriorly a distance of up to about 9 mm. Edematous change of the anterior thigh musculature related to the fracture.  2. No evidence of dislocation or distinct acetabular fracture.  3. 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    Signer Name: Tim Keen MD   Signed: 4/8/2020 3:36 PM   Workstation Name: [id number]    [facility]    XR Chest 1 View [[id number]] Collected:  04/08/20 1520     Updated:  04/08/20 1526    Narrative:       Chest x-ray portable    INDICATION: Right hip fracture, preop for surgery in a.m.    FINDINGS: Single frontal portable view the chest timed 4/8/2020 is submitted for review. Comparison chest x-ray is from 12/25/2019. There is a small amount of linear airspace disease/atelectasis at the left base. Lungs are otherwise clear. The right  perihilar opacity noted  previously has resolved. There is no congestive failure. There is no pleural effusion or pneumothorax. The heart size is top normal.      Impression:       1. Small amount of linear airspace disease or atelectasis left base, otherwise no active disease.    Signer Name: Sabrina Hudson MD   Signed: 4/8/2020 3:20 PM   [facility]: [id number]    [facility]    XR Spine Lumbar Complete 4+VW [[id number]] Collected:  04/08/20 1332     Updated:  04/08/20 1335    Narrative:       XR SPINE LUMBAR COMPLETE 4+VW-: 4/8/2020 12:52 PM     INDICATION:   60-year-old male with a history of osteosarcoma and right leg amputation  in 1989. Fell today and has severe right mammogram and back pain.     COMPARISON:   None available.     FINDINGS:  5 views of the lumbar spine. Vertebral body heights and alignment are  preserved. No acute fracture. No pars defect. No significant  degenerative change..       Impression:       Negative lumbar spine.     This report was finalized on 4/8/2020 1:33 PM by Dr. Tim Keen MD.       XR Hip With or Without Pelvis 2 - 3 View Right [[id number]] Collected:  04/08/20 1328     Updated:  04/08/20 1334    Narrative:       XR HIP W OR WO PELVIS 2-3 VIEW RIGHT-: 4/8/2020 12:53 PM     INDICATION:   60-year-old male with a history of right leg amputation in 1989  suffering a fall today. Severe right limb and back pain .     COMPARISON:   None available.     FINDINGS:  AP and frog-leg lateral view(s) of the right hip.  There is asymmetric  osteoporosis of the right hemipelvis and right hip, likely related to  osteoporosis of disuse. There is an oblique likely complete  intertrochanteric fracture of the right hip. There is displacement of  the lesser trochanter medially a distance of about 7 mm.  Status post  amputation of the right femur distal to the proximal third shaft.         Impression:          1. Oblique and trochanteric fracture of the right hip, likely complete  in nature. Mild  displacement of the lesser trochanter medially 7 mm.     This report was finalized on 4/8/2020 1:31 PM by Dr. Tim Keen MD.                  lactated ringers 9 mL/hr Last Rate: Stopped (04/09/20 1331)   lactated ringers 30 mL/hr Last Rate: 30 mL/hr (04/09/20 1348)         Assessment/Plan     Patient Active Problem List   Diagnosis Code   • Intertrochanteric fracture of right hip (CMS/[facility]) S72.141A   • Closed nondisplaced intertrochanteric fracture of right femur (CMS/[facility]) S72.144A       DC Hemovac.  Continue PT OT.  Home when stable with crutches or walker pain control oral medication  Jorge Wilcox MD  04/10/20  07:09          Dictated utilizing Dragon dictation

## 2020-04-10 NOTE — THERAPY DISCHARGE NOTE
Acute Care - Physical Therapy Treatment Note/Discharge   Anna Reynolds     Patient Name: Charlie Solano  : 1960  MRN: [id number]  Today's Date: 4/10/2020  Onset of Illness/Injury or Date of Surgery: 20  Date of Referral to PT: 20  Referring Physician: Dr. Wilcox    Admit Date: 2020    Visit Dx:    ICD-10-CM ICD-9-CM   1. Closed nondisplaced intertrochanteric fracture of right femur, initial encounter (CMS/HCC) S72.144A 820.21   2. Closed displaced intertrochanteric fracture of right femur, initial encounter (CMS/HCC) S72.141A 820.21     Patient Active Problem List   Diagnosis   • Intertrochanteric fracture of right hip (CMS/[facility])   • Closed nondisplaced intertrochanteric fracture of right femur (CMS/[facility])         Rehab Goal Summary     Row Name 04/10/20 0950             Bed Mobility Goal 1 (PT)    Activity/Assistive Device (Bed Mobility Goal 1, PT)  bed mobility activities, all  -BP      [place] Level/Cues Needed (Bed Mobility Goal 1, PT)  supervision required  -BP      Time Frame (Bed Mobility Goal 1, PT)  2 days  -BP      Progress/Outcomes (Bed Mobility Goal 1, PT)  goal met  -BP         Transfer Goal 1 (PT)    Activity/Assistive Device (Transfer Goal 1, PT)  sit-to-stand/stand-to-sit;walker, rolling  -BP      [place] Level/Cues Needed (Transfer Goal 1, PT)  supervision required  -BP      Time Frame (Transfer Goal 1, PT)  2 days  -BP      Progress/Outcome (Transfer Goal 1, PT)  goal met  -BP         Gait Training Goal 1 (PT)    Activity/Assistive Device (Gait Training Goal 1, PT)  gait (walking locomotion);walker, rolling  -BP      [place] Level (Gait Training Goal 1, PT)  supervision required  -BP      Distance (Gait Goal 1, PT)  50  -BP      Time Frame (Gait Training Goal 1, PT)  2 days  -BP      Progress/Outcome (Gait Training Goal 1, PT)  goal met  -BP        User Key  (r) = Recorded By, (t) = Taken By, (c) = Cosigned By    Initials Name Provider Type Discipline    BP Richy,  Darius, PT Physical Therapist PT        Therapy Treatment  Rehabilitation Treatment Summary     Row Name 04/10/20 0950             Treatment Time/Intention    Discipline  physical therapist  -BP      Document Type  discharge treatment  -BP      Subjective Information  no complaints  -BP      Mode of Treatment  physical therapy  -BP      Patient/Family Observations  Patient supine in bed with HOB elevated. Patient agreeable to PT treatment  -BP      Care Plan Review  risks/benefits reviewed  -BP      Patient Effort  good  -BP      Existing Precautions/Restrictions  fall  -BP      Patient Response to Treatment  Patient has no questions or concerns regarding return home. Plan to discharge patient from PT at this time.   -BP      Recorded by [BP] Darius Lockhart, PT 04/10/20 1123      Row Name 04/10/20 0950             Cognitive Assessment/Intervention    Additional Documentation  Cognitive Assessment/Intervention (Group)  -BP      Recorded by [BP] Darius Lockhart, PT 04/10/20 1123      Row Name 04/10/20 0950             Cognitive Assessment/Intervention- PT/OT    Personal Safety Interventions  gait belt;nonskid shoes/slippers when out of bed  -BP      Recorded by [BP] Darius Lockhart, PT 04/10/20 1123      Row Name 04/10/20 0950             Bed Mobility Assessment/Treatment    Bed Mobility Assessment/Treatment  supine-sit  -BP      Supine-Sit Rogers (Bed Mobility)  supervision  -BP      Assistive Device (Bed Mobility)  head of bed elevated  -BP      Recorded by [BP] Darius Lockhart, PT 04/10/20 1123      Row Name 04/10/20 0950             Transfer Assessment/Treatment    Transfer Assessment/Treatment  sit-stand transfer;stand-sit transfer  -BP      Comment (Transfers)  Patient demonstrates proper hand placement during transfers. Performed sit to/from stand transfers x 2.   -BP      Recorded by [BP] Darius Lockhart, PT 04/10/20 1123      Row Name 04/10/20 0950             Sit-Stand Transfer     Sit-Stand [place] (Transfers)  supervision  -BP      Assistive Device (Sit-Stand Transfers)  walker, front-wheeled  -BP      Recorded by [BP] Darius Lockhart, PT 04/10/20 1123      Row Name 04/10/20 0950             Stand-Sit Transfer    Stand-Sit [place] (Transfers)  supervision  -BP      Assistive Device (Stand-Sit Transfers)  walker, front-wheeled  -BP      Recorded by [BP] Darius Lockhart, PT 04/10/20 1123      Row Name 04/10/20 0950             Gait/Stairs Assessment/Training    Calvert Level (Gait)  supervision  -BP      Assistive Device (Gait)  walker, front-wheeled  -BP      Distance in Feet (Gait)  114  -BP      Deviations/Abnormal Patterns (Gait)  gait speed decreased  -BP      Comment (Gait/Stairs)  Patient demonstrates safe use of AD during gait. No loss of balance noted. Patient has no concerns regarding stair training and politely refuses need for stair training.   -BP      Recorded by [BP] Darius Lockhart, PT 04/10/20 1123      Row Name 04/10/20 0950             Pain Assessment    Additional Documentation  Pain Scale: Numbers Pre/Post-Treatment (Group)  -BP      Recorded by [BP] Darius Lockhart, PT 04/10/20 1123      Row Name 04/10/20 0950             Pain Scale: Numbers Pre/Post-Treatment    Pre/Post Treatment Pain Comment  Patient does not complain of pain   -BP      Recorded by [BP] Darius Lockhart, PT 04/10/20 1123      Row Name                Residual Limb Assessment 04/08/20 2215 transfemoral (above knee), right    Residual Limb Assessment - Properties Group Date first assessed: 04/08/20 [MB] Time first assessed: 2215 [MB] Location: transfemoral (above knee), right [MB] Additional Comments: was amputated 31 yrs. ago [MB] Recorded by:  [MB] Lisa Krishnan RN 04/08/20 2216    Row Name                Wound 04/09/20 1050 Right anterior hip Incision    Wound - Properties Group Date first assessed: 04/09/20 [ZANE] Time first assessed: 1050 [ZANE] Present on Hospital Admission:  N [ZANE] Side: Right [ZANE] Orientation: anterior [ZANE] Location: hip [ZANE] Primary Wound Type: Incision [ZANE] Number of Sutures Placed: 12 [ZANE] Recorded by:  [ZANE] Natalie Moon RN 04/09/20 1051    Row Name 04/10/20 0950             Plan of Care Review    Plan of Care Reviewed With  patient  -BP      Outcome Summary  PT: Patient performs supine to sit transfer with supervision, sit to/from stand transfers with supervision and gait x 114 feet with supervision with use of FWW. Patient demonstrates safe use of AD without loss of balance. Patient has no concerns regarding stair training and politely refuses need. Patient has been ascending/descending stairs with crutches without prosethesis x 2 years.  Patient has met all goals and does not have any concerns for return home. Recommend continued use of FWW. No further inpatient skilled PT needs at this time. Will discharge from therapy.   -BP      Recorded by [BP] Darius Lockhart, PT 04/10/20 1123      Row Name 04/10/20 0945             Outcome Summary/Treatment Plan (PT)    Daily Summary of Progress (PT)  prepare for discharge  -BP      Reason for Discharge (PT Discharge Summary)  no further needs identified;patient met all goals and outcomes  -BP      Recorded by [BP] Darius Lockhart, PT 04/10/20 1123        User Key  (r) = Recorded By, (t) = Taken By, (c) = Cosigned By    Initials Name Effective Dates Discipline    Natalie Crespo RN 06/16/16 -  Nurse    Lisa Romero RN 05/10/19 -  Nurse    Darius Joshua, PT 04/03/18 -  PT        Wound 04/09/20 1050 Right anterior hip Incision (Active)   Dressing Appearance dry;intact 4/10/2020  8:37 AM   Base dressing in place, unable to visualize 4/9/2020  8:58 PM   Periwound pink 4/9/2020  1:20 PM   Drainage Amount none 4/9/2020  8:58 PM       PT Recommendation and Plan  Anticipated Discharge Disposition (PT): home with home health  Planned Therapy Interventions (PT Eval): bed mobility training, gait  training, home exercise program, patient/family education, transfer training, strengthening  Therapy Frequency (PT Clinical Impression): daily  Outcome Summary/Treatment Plan (PT)  Daily Summary of Progress (PT): prepare for discharge  Anticipated Equipment Needs at Discharge (PT): front wheeled walker  Anticipated Discharge Disposition (PT): home with home health  Reason for Discharge (PT Discharge Summary): no further needs identified, patient met all goals and outcomes  Plan of Care Reviewed With: patient  Outcome Summary: PT: Patient performs supine to sit transfer with supervision, sit to/from stand transfers with supervision and gait x 114 feet with supervision with use of FWW. Patient demonstrates safe use of AD without loss of balance. Patient has no concerns regarding stair training and politely refuses need. Patient has been ascending/descending stairs with crutches without prosethesis x 2 years.  Patient has met all goals and does not have any concerns for return home. Recommend continued use of FWW. No further inpatient skilled PT needs at this time. Will discharge from therapy.     Outcome Measures     Row Name 04/10/20 0950 04/09/20 1500 04/09/20 1353       How much help from another person do you currently need...    Turning from your back to your side while in flat bed without using bedrails?  3  -BP  --  --    Moving from lying on back to sitting on the side of a flat bed without bedrails?  3  -BP  --  --    Moving to and from a bed to a chair (including a wheelchair)?  3  -BP  --  --    Standing up from a chair using your arms (e.g., wheelchair, bedside chair)?  3  -BP  --  --    Climbing 3-5 steps with a railing?  3  -BP  --  --    To walk in hospital room?  3  -BP  --  --    AM-PAC 6 Clicks Score (PT)  18  -BP  --  --       How much help from another is currently needed...    Putting on and taking off regular lower body clothing?  --  --  3  -EN    Bathing (including washing, rinsing, and drying)   --  --  3  -EN    Toileting (which includes using toilet bed pan or urinal)  --  --  3  -EN    Putting on and taking off regular upper body clothing  --  --  4  -EN    Taking care of personal grooming (such as brushing teeth)  --  --  4  -EN    Eating meals  --  --  4  -EN    AM-PAC 6 Clicks Score (OT)  --  --  21  -EN       Functional Assessment    Outcome Measure Options  AM-PAC 6 Clicks Basic Mobility (PT)  -BP  AM-PAC 6 Clicks Daily Activity (OT)  -EN  --    Row Name 04/09/20 1352             How much help from another person do you currently need...    Turning from your back to your side while in flat bed without using bedrails?  3  -BP      Moving from lying on back to sitting on the side of a flat bed without bedrails?  3  -BP      Moving to and from a bed to a chair (including a wheelchair)?  3  -BP      Standing up from a chair using your arms (e.g., wheelchair, bedside chair)?  3  -BP      Climbing 3-5 steps with a railing?  3  -BP      To walk in hospital room?  3  -BP      AM-PAC 6 Clicks Score (PT)  18  -BP         Functional Assessment    Outcome Measure Options  AM-PAC 6 Clicks Basic Mobility (PT)  -BP        User Key  (r) = Recorded By, (t) = Taken By, (c) = Cosigned By    Initials Name Provider Type    Era Louise, OTR Occupational Therapist    Darius Joshua PT Physical Therapist           Time Calculation:   PT Charges     Row Name 04/10/20 1126             Time Calculation    Start Time  0950  -BP      Stop Time  1009  -BP      Time Calculation (min)  19 min  -BP      PT Received On  04/10/20  -BP         Timed Charges    88858 - Gait Training Minutes   19  -BP        User Key  (r) = Recorded By, (t) = Taken By, (c) = Cosigned By    Initials Name Provider Type    Darius Joshua PT Physical Therapist        Therapy Charges for Today     Code Description Service Date Service Provider Modifiers Qty    64633408189 HC PT EVAL LOW COMPLEXITY 3 4/9/2020 Darius Lockhart PT  GP 1    [id number] HC GAIT TRAINING EA 15 MIN 4/10/2020 Darius Lockhart, PT GP 1          PT G-Codes  Outcome Measure Options: AM-PAC 6 Clicks Basic Mobility (PT)  AM-PAC 6 Clicks Score (PT): 18  AM-PAC 6 Clicks Score (OT): 21    PT Discharge Summary  Anticipated Discharge Disposition (PT): home with home health  Reason for Discharge: All goals achieved  Outcomes Achieved: Able to achieve all goals within established timeline  Discharge Destination: Home with home health    Darius Lockhart, PT  4/10/2020

## 2020-04-10 NOTE — PLAN OF CARE
OT- Performed supine to sit with supervision, sit to stand with supervision and donned underwear with supervision. Patient issued reacher for improved safety during ADL/IADL routine. Patient doing well and has no concerns regarding ADLs, no further skilled OT needs at this time.

## 2020-04-10 NOTE — PROGRESS NOTES
Orthopedic Progress Note   Chief Complaint: Status post ORIF right intertrochanteric hip fracture    Subjective     Interval History: Patient is doing well.  His been released by physical therapy and OT.  Pain control oral medication          Objective     Vital Signs  Temp:  [97.6 °F (36.4 °C)-99 °F (37.2 °C)] 97.6 °F (36.4 °C)  Heart Rate:  [78-98] 78  Resp:  [11-23] 18  BP: (103-151)/(60-84) 131/65  Body mass index is 33.66 kg/m².    Intake/Output Summary (Last 24 hours) at 4/10/2020 1057  Last data filed at 4/10/2020 1016  Gross per 24 hour   Intake 1814 ml   Output 370 ml   Net 1444 ml     I/O this shift:  In: 614 [I.V.:614]  Out: 20 [Drains:20]       Physical Exam:   General: patient awake, alert and cooperative   Cardiovascular: regular rhythm and rate   Pulm: clear to auscultation bilaterally   Abdomen: Benign.  Soft bowel sounds   Extremities: Wound remains benign.  No drainage   Neurologic: Normal mood and behavior     Results Review:     I reviewed the patient's new clinical results.      WBC No results found for: WBCS   HGB Hemoglobin   Date Value Ref Range Status   04/10/2020 12.5 (L) 13.0 - 17.7 g/dL Final   04/08/2020 16.2 13.0 - 17.7 g/dL Final      HCT Hematocrit   Date Value Ref Range Status   04/10/2020 37.5 37.5 - 51.0 % Final   04/08/2020 47.7 37.5 - 51.0 % Final      Platlets No results found for: LABPLAT     PT/INR:  No results found for: PROTIME/No results found for: INR    Sodium Sodium   Date Value Ref Range Status   04/08/2020 137 136 - 145 mmol/L Final      Potassium Potassium   Date Value Ref Range Status   04/08/2020 4.1 3.5 - 5.2 mmol/L Final      Chloride Chloride   Date Value Ref Range Status   04/08/2020 105 98 - 107 mmol/L Final      Bicarbonate No results found for: PLASMABICARB   BUN BUN   Date Value Ref Range Status   04/08/2020 15 6 - 20 mg/dL Final      Creatinine Creatinine   Date Value Ref Range Status   04/08/2020 1.21 0.76 - 1.27 mg/dL Final      Calcium Calcium   Date  Value Ref Range Status   04/08/2020 8.9 8.6 - 10.5 mg/dL Final      Magnesium  AST  ALT  Bilirubin, Total  AlkPhos  Albumin    Amylase  Lipase    Radiology: No results found for: MG  No components found for: AST.*  No components found for: ALT.*  No results found for: BILIRUBIN    No components found for: ALKPHOS.*  No components found for: ALBUMIN.*      No components found for: AMYLASE.*  No components found for: LIPASE.*            Imaging Results (Most Recent)     Procedure Component Value Units Date/Time    FL C Arm During Surgery [[id number]] Resulted:  04/09/20 1258     Updated:  04/09/20 1258    XR Hip With or Without Pelvis 2 - 3 View Right [[id number]] Collected:  04/09/20 1122     Updated:  04/09/20 1125    Narrative:       FLUOROSCOPY DURING RIGHT HIP INTRAOPERATIVE PINNING PROCEDURE,  04/09/2020     HISTORY:  Intertrochanteric fracture of the right hip yesterday     REPORT:  C-arm fluoroscopy was provided by radiology personnel in the OR during  surgical pinning of a right hip fracture. Fluoroscopy time was recorded  as 246 seconds. 5 spot film images were recorded for documentation  purposes. Please see operative note for details.     This report was finalized on 4/9/2020 11:23 AM by Dr. Tim Keen MD.       CT Lower Extremity Right Without Contrast [[id number]] Collected:  04/08/20 1536     Updated:  04/08/20 1539    Narrative:       CT LE RT WO    INDICATION:    60-year-old male with a history of intertrochanteric hip fracture. 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 Fell today and has severe right limb and back pain.    TECHNIQUE:   CT of the right lower extremity without IV contrast. Coronal and sagittal reconstructions were obtained.  Radiation dose reduction techniques included automated exposure control or exposure modulation based on body size. Count of known CT and cardiac nuc  med studies performed in previous 12 months: 1.      COMPARISON:    None  available.    FINDINGS:  The included bowel is unremarkable and the appendix is normal. The bladder and prostate gland appear within normal limits. There is no inguinal adenopathy or drainable fluid collection.    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 There is no  evidence of acetabular fracture. There is a complete oblique intertrochanteric fracture of the right hip. The fracture line is best demonstrated on the coronal reformats. There is mild medial displacement of the lesser trochanter medially and anteriorly  a distance of about 9 mm. No evidence of hip dislocation. There is edematous change of the thigh musculature on the right related to the fracture and displacement of the lesser trochanter.      Impression:         1. Complete intertrochanteric fracture of the right hip with displacement of the lesser trochanter medially and anteriorly a distance of up to about 9 mm. Edematous change of the anterior thigh musculature related to the fracture.  2. No evidence of dislocation or distinct acetabular fracture.  3. 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    Signer Name: Tim Keen MD   Signed: 4/8/2020 3:36 PM   Workstation Name: [id number]    [facility]    XR Chest 1 View [[id number]] Collected:  04/08/20 1520     Updated:  04/08/20 1526    Narrative:       Chest x-ray portable    INDICATION: Right hip fracture, preop for surgery in a.m.    FINDINGS: Single frontal portable view the chest timed 4/8/2020 is submitted for review. Comparison chest x-ray is from 12/25/2019. There is a small amount of linear airspace disease/atelectasis at the left base. Lungs are otherwise clear. The right  perihilar opacity noted previously has resolved. There is no  congestive failure. There is no pleural effusion or pneumothorax. The heart size is top normal.      Impression:       1. Small amount of linear airspace disease or atelectasis left base, otherwise no active disease.    Signer Name: Sabrina Hudson MD   Signed: 4/8/2020 3:20 PM   Workstation Name: [id number]    [facility]    XR Spine Lumbar Complete 4+VW [[id number]] Collected:  04/08/20 1332     Updated:  04/08/20 1335    Narrative:       XR SPINE LUMBAR COMPLETE 4+VW-: 4/8/2020 12:52 PM     INDICATION:   60-year-old male with a history of osteosarcoma and right leg amputation  in 1989. Fell today and has severe right mammogram and back pain.     COMPARISON:   None available.     FINDINGS:  5 views of the lumbar spine. Vertebral body heights and alignment are  preserved. No acute fracture. No pars defect. No significant  degenerative change..       Impression:       Negative lumbar spine.     This report was finalized on 4/8/2020 1:33 PM by Dr. Tim Keen MD.       XR Hip With or Without Pelvis 2 - 3 View Right [[id number]] Collected:  04/08/20 1328     Updated:  04/08/20 1334    Narrative:       XR HIP W OR WO PELVIS 2-3 VIEW RIGHT-: 4/8/2020 12:53 PM     INDICATION:   60-year-old male with a history of right leg amputation in 1989  suffering a fall today. Severe right limb and back pain .     COMPARISON:   None available.     FINDINGS:  AP and frog-leg lateral view(s) of the right hip.  There is asymmetric  osteoporosis of the right hemipelvis and right hip, likely related to  osteoporosis of disuse. There is an oblique likely complete  intertrochanteric fracture of the right hip. There is displacement of  the lesser trochanter medially a distance of about 7 mm.  Status post  amputation of the right femur distal to the proximal third shaft.         Impression:          1. Oblique and trochanteric fracture of the right hip, likely complete  in nature. Mild displacement of the lesser  trochanter medially 7 mm.     This report was finalized on 4/8/2020 1:31 PM by Dr. Tim Keen MD.                       Assessment/Plan     Patient Active Problem List   Diagnosis Code   • Intertrochanteric fracture of right hip (CMS/[facility]) S72.141A   • Closed nondisplaced intertrochanteric fracture of right femur (CMS/[facility]) S72.144A         Activity instructions discussed with patient.  He may shower.  Again he is not to use his prosthesis but must use his crutches or walker for ambulation.  We will sent home with Percocet for pain and aspirin for DVT prophylaxis 325 for 2 weeks and then he can go back to the 81 mg.    Jorge Wilcox MD  04/10/20  10:57          Dictated utilizing Dragon dictation

## 2020-04-10 NOTE — NURSING NOTE
"Continued Stay Note  [facility]     Patient Name: Charlie Solano  MRN: [id number]  Today's Date: 4/10/2020    Admit Date: 4/8/2020    Discharge Plan     Row Name 04/10/20 1210       Plan    Plan  Home    Plan Comments  Patient is noted for discharge today. Into room to discuss home health and patient states \"I really don't really think I will need them\". He also states that his home had been modified for his AKA and he has all the equipment he needs and feels with the rolling walker he is getting he will be fine at home and has wife and adult son to help as needed. CM wrote phone number on the home health list is case he changes his mind once he gets home. Patient had no other questions or concerns regarding discharge plans at this time. CM provided patient with rolling walker prior to discharge. CM will follow through discharge.        Discharge Codes    No documentation.       Expected Discharge Date and Time     Expected Discharge Date Expected Discharge Time    Apr 10, 2020             Elaina Busby RN    "

## 2020-04-11 ENCOUNTER — READMISSION MANAGEMENT (OUTPATIENT)
Dept: CALL CENTER | Facility: HOSPITAL | Age: 60
End: 2020-04-11

## 2020-04-11 NOTE — OUTREACH NOTE
Prep Survey      Responses   [facility] discharged from?  [facility]   Is LACE score < 7 ?  Yes   Eligibility  Readm Mgmt   Discharge diagnosis   Right Intertrochanteric Hip Fracture ORIF.     COVID-19 Test Status  Not tested   Does the patient have one of the following disease processes/diagnoses(primary or secondary)?  General Surgery   Does the patient have Home health ordered?  No   Is there a DME ordered?  No   Prep survey completed?  Yes          Claudia Whalen RN

## 2020-04-11 NOTE — OUTREACH NOTE
Prep Survey      Responses   [facility] facility patient discharged from?  [facility]   Is LACE score < 7 ?  Yes   Eligibility  Readm Mgmt   Discharge diagnosis   Right Intertrochanteric Hip Fracture ORIF.     Does the patient have one of the following disease processes/diagnoses(primary or secondary)?  Other   Does the patient have Home health ordered?  No   Is there a DME ordered?  No   Prep survey completed?  Yes          Claudia Whalen RN

## 2020-04-13 ENCOUNTER — TELEPHONE (OUTPATIENT)
Dept: ORTHOPEDIC SURGERY | Facility: CLINIC | Age: 60
End: 2020-04-13

## 2020-04-13 ENCOUNTER — READMISSION MANAGEMENT (OUTPATIENT)
Dept: CALL CENTER | Facility: HOSPITAL | Age: 60
End: 2020-04-13

## 2020-04-13 DIAGNOSIS — S72.141A CLOSED DISPLACED INTERTROCHANTERIC FRACTURE OF RIGHT FEMUR, INITIAL ENCOUNTER (HCC): ICD-10-CM

## 2020-04-13 RX ORDER — OXYCODONE AND ACETAMINOPHEN 7.5; 325 MG/1; MG/1
2 TABLET ORAL EVERY 4 HOURS PRN
Qty: 42 TABLET | Refills: 0 | Status: SHIPPED | OUTPATIENT
Start: 2020-04-13 | End: 2020-04-22

## 2020-04-13 NOTE — TELEPHONE ENCOUNTER
Patient calling requesting an RX refill of pain medication he last filled oxyCODONE-acetaminophen (PERCOCET) 7.5-325 MG per tablet-Sig: Take 2 tablets by mouth Every 4 (Four) Hours As Needed for Moderate Pain  or Severe Pain  for up to 9 days.  Sig: Take 2 tablets by mouth Every 4 (Four) Hours As Needed for Moderate Pain  or Severe Pain  for up to 9 days.Dispense Quantity: 42 tablets.    Status post ORIF right intertrochanteric hip fracture 04.09.2020.    Last filled 04.10.2020.

## 2020-04-13 NOTE — OUTREACH NOTE
LAG < 7 Survey      Responses   [facility] from?  [facility]   COVID-19 Test Status  Not tested   Does the patient have one of the following disease processes/diagnoses(primary or secondary)?  Other   Is there a successful TCM telephone encounter documented?  No   BHLAG <7 Attempt successful?  Yes   Call start time  1056   Call end time  1116   Discharge diagnosis   Right Intertrochanteric Hip Fracture ORIF.     Meds reviewed with patient/caregiver?  Yes   Is the patient having any side effects they believe may be caused by any medication additions or changes?  No   Is the patient taking all medications as directed (includes completed medication regime)?  Yes   Comments regarding appointments  may reschedule PCP  4/15 visit as virtual visit and with surg f/u too   Does the patient have a primary care provider?   Yes   Does the patient have an appointment with their PCP within 7 days of discharge?  Yes   Has the patient kept scheduled appointments due by today?  N/A   Has home health visited the patient within 72 hours of discharge?  N/A   Psychosocial issues?  No   Comments  pt is amputee of affected hip and is experieincing swelling and hardness of remaining portion of leg, advised pt to contact PCP and surg which is the plan for today   Nursing interventions  Reviewed instructions with patient, Educated on MyChart [reviewed use of Play[employer] to contact MD and arrange Virtual visits]   What is the patient's perception of their health status since discharge?  Improving   Is the patient/caregiver able to teach back signs and symptoms related to disease process for when to call PCP?  Yes   Is the patient/caregiver able to teach back signs and symptoms related to disease process for when to call 911?  Yes   Is the patient/caregiver able to teach back the hierarchy of who to call/visit for symptoms/problems? PCP, Specialist, Home health nurse, Urgent Care, ED, 911  Yes   Additional teach back  comments  post op care review, including incision site care, advised pt to use ice for swelling with care taken to protect skin from direct contact with ice, c/o severe constipation on Percocet, suggested stronger stool softeners with stimulant, prune juice, Miralax and increased fluid intake, pt agreed with plan and also will speak to PCP today   Graduated  Yes          Radha Sheppard RN

## 2020-04-15 ENCOUNTER — TELEPHONE (OUTPATIENT)
Dept: ORTHOPEDIC SURGERY | Facility: CLINIC | Age: 60
End: 2020-04-15

## 2020-04-15 ENCOUNTER — HOSPITAL ENCOUNTER (OUTPATIENT)
Dept: ULTRASOUND IMAGING | Facility: HOSPITAL | Age: 60
Discharge: HOME | End: 2020-04-15
Admitting: ORTHOPAEDIC SURGERY

## 2020-04-15 DIAGNOSIS — S72.141A CLOSED DISPLACED INTERTROCHANTERIC FRACTURE OF RIGHT FEMUR, INITIAL ENCOUNTER (HCC): Primary | ICD-10-CM

## 2020-04-15 DIAGNOSIS — S72.141A CLOSED DISPLACED INTERTROCHANTERIC FRACTURE OF RIGHT FEMUR, INITIAL ENCOUNTER (HCC): ICD-10-CM

## 2020-04-15 PROCEDURE — 93971 EXTREMITY STUDY: CPT

## 2020-04-15 NOTE — TELEPHONE ENCOUNTER
Patient calling stating that his stump is extremely swollen and there is so much pressure its casing extreme pain into his genital area. He is status post ORIF right intertrochanteric hip fracture 04.09.2020.    Please advise.

## 2020-04-16 ENCOUNTER — OFFICE VISIT (OUTPATIENT)
Dept: ORTHOPEDIC SURGERY | Facility: CLINIC | Age: 60
End: 2020-04-16

## 2020-04-16 VITALS — WEIGHT: 214 LBS | HEIGHT: 67 IN | BODY MASS INDEX: 33.59 KG/M2

## 2020-04-16 DIAGNOSIS — S72.141A CLOSED DISPLACED INTERTROCHANTERIC FRACTURE OF RIGHT FEMUR, INITIAL ENCOUNTER (HCC): Primary | ICD-10-CM

## 2020-04-16 DIAGNOSIS — Z09 STATUS POST ORTHOPEDIC SURGERY, FOLLOW-UP EXAM: ICD-10-CM

## 2020-04-16 PROCEDURE — 73502 X-RAY EXAM HIP UNI 2-3 VIEWS: CPT | Performed by: ORTHOPAEDIC SURGERY

## 2020-04-16 PROCEDURE — 99024 POSTOP FOLLOW-UP VISIT: CPT | Performed by: ORTHOPAEDIC SURGERY

## 2020-04-16 RX ORDER — PREDNISONE 10 MG/1
30 TABLET ORAL SEE ADMIN INSTRUCTIONS
Qty: 9 TABLET | Refills: 0 | Status: SHIPPED | OUTPATIENT
Start: 2020-04-16 | End: 2020-04-20

## 2020-04-16 NOTE — PROGRESS NOTES
Subjective: Status post  ORIF right intertrochanteric hip fracture     Patient ID: Charlie Solano is a 60 y.o. male.    Chief Complaint:    History of Present Illness 54 male known to me having undergone ORIF of a right intertrochanteric hip fracture this past week called yesterday because of increasing swelling in his thigh.  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  Venous Doppler ordered was negative is seen in the office today.       Social History     Occupational History   • Not on file   Tobacco Use   • Smoking status: Never Smoker   • Smokeless tobacco: Never Used   Substance and Sexual Activity   • Alcohol use: Not Currently     Comment: rarely   • Drug use: Never   • Sexual activity: Defer      Review of Systems   Constitutional: Negative for chills, diaphoresis, fever and unexpected weight change.   HENT: Negative for hearing loss, nosebleeds, sore throat and tinnitus.    Eyes: Negative for pain and visual disturbance.   Respiratory: Negative for cough, shortness of breath and wheezing.    Cardiovascular: Negative for chest pain and palpitations.   Gastrointestinal: Negative for abdominal pain, diarrhea, nausea and vomiting.   Endocrine: Negative for cold intolerance, heat intolerance and polydipsia.   Genitourinary: Negative for difficulty urinating, dysuria and hematuria.   Musculoskeletal: Positive for arthralgias and joint swelling. Negative for myalgias.   Skin: Positive for wound. Negative for rash.   Allergic/Immunologic: Negative for environmental allergies.   Neurological: Negative for dizziness, syncope and numbness.   Hematological: Does not bruise/bleed easily.   Psychiatric/Behavioral: Negative for dysphoric mood and sleep disturbance. The patient is not nervous/anxious.          Past Medical History:   Diagnosis Date   • Disease of thyroid gland     Hypothyroid   • Factor 5 Leiden mutation, heterozygous (CMS/HCC)    • Hyperlipidemia     Controlled w/ SImvastatin    • Osteogenic sarcoma (CMS/HCC)     right leg     Past Surgical History:   Procedure Laterality Date   • ADENOIDECTOMY     • BELOW KNEE LEG AMPUTATION  1989    right leg   • HIP PINNING Right 4/9/2020    Procedure: HIP COMPRESSION SCREW-[place] compression plate;  Surgeon: Jorge Wilcox MD;  Location: [facility];  Service: Orthopedics;  Laterality: Right;   • SINUS SURGERY     • TONSILLECTOMY       Family History   Problem Relation Age of Onset   • Transient ischemic attack Mother    • Heart attack Brother    • Diabetes Brother    • Heart disease Brother         CAD w/ CABG         Objective:  There were no vitals filed for this visit.      04/16/20  1424   Weight: 97.1 kg (214 lb)     Body mass index is 33.52 kg/m².        Ortho Exam   An x-ray of the hip does show the implant and fixation of change in good position.  He does have moderate swelling and significant moderate swelling and ecchymosis into his scrotum.  Having moderate pain as expected.  The wound is benign otherwise.    Assessment:        1. Closed displaced intertrochanteric fracture of right femur, initial encounter (CMS/[facility])    2. Status post orthopedic surgery, follow-up exam           Plan: Discussed with the patient and his wife the swelling is probably due to the surgery and the injury but it is no place for the swelling and bleeding to dispense because of his AKA amputation.  I will put him on some steroids i.e. prednisone twice today, 3 times on Friday then twice on Saturday and Sunday and then once on Monday.  I will see her back on Monday for a quick check.  Instructed to call over the weekend should any problems develop.  He was instructed to take the pain medicine every 4 hours as needed not try to hold outer and take it only with a cane severe.  Answered all questions            Work Status:    [facility] query complete.    Orders:  Orders Placed This Encounter   Procedures   • XR Hip With or Without Pelvis 2 - 3 View Right        Medications:  No orders of the defined types were placed in this encounter.      Followup:  Return in about 4 days (around 4/20/2020).          Dictated utilizing Dragon dictation   Answers for HPI/ROS submitted by the patient on 4/16/2020   What is the primary reason for your visit?: Other  Please describe your symptoms.: Excessive swelling in right limb and scrotal area, given 4.9.20 surgery for fractured hip.  Have you had these symptoms before?: No  How long have you been having these symptoms?: past week  Please list any medications you are currently taking for this condition.: As prescribed by   Please describe any probable cause for these symptoms. : Surgery, fractured hip.

## 2020-04-20 ENCOUNTER — OFFICE VISIT (OUTPATIENT)
Dept: ORTHOPEDIC SURGERY | Facility: CLINIC | Age: 60
End: 2020-04-20

## 2020-04-20 VITALS — WEIGHT: 214 LBS | BODY MASS INDEX: 33.59 KG/M2 | HEIGHT: 67 IN

## 2020-04-20 DIAGNOSIS — Z09 STATUS POST ORTHOPEDIC SURGERY, FOLLOW-UP EXAM: ICD-10-CM

## 2020-04-20 DIAGNOSIS — S72.141A CLOSED DISPLACED INTERTROCHANTERIC FRACTURE OF RIGHT FEMUR, INITIAL ENCOUNTER (HCC): Primary | ICD-10-CM

## 2020-04-20 PROCEDURE — 99024 POSTOP FOLLOW-UP VISIT: CPT | Performed by: ORTHOPAEDIC SURGERY

## 2020-04-20 NOTE — PROGRESS NOTES
Subjective: Status post ORIF right intertrochanteric hip fracture     Patient ID: Charlie Solano is a 60 y.o. male.    Chief Complaint:  See  History of Present Illness patient seen in follow-up.  On Friday was seen for bruising and ecchymosis of his stump in the groin area is placed on steroids and he states has helped reduce the swelling and his pain.  Still cannot sit for long periods of time but is doing better     Social History     Occupational History   • Not on file   Tobacco Use   • Smoking status: Never Smoker   • Smokeless tobacco: Never Used   Substance and Sexual Activity   • Alcohol use: Not Currently     Comment: rarely   • Drug use: Never   • Sexual activity: Defer      Review of Systems   Constitutional: Negative for chills, diaphoresis, fever and unexpected weight change.   HENT: Negative for hearing loss, nosebleeds, sore throat and tinnitus.    Eyes: Negative for pain and visual disturbance.   Respiratory: Negative for cough, shortness of breath and wheezing.    Cardiovascular: Negative for chest pain and palpitations.   Gastrointestinal: Negative for abdominal pain, diarrhea, nausea and vomiting.   Endocrine: Negative for cold intolerance, heat intolerance and polydipsia.   Genitourinary: Negative for difficulty urinating, dysuria and hematuria.   Musculoskeletal: Positive for arthralgias, joint swelling and myalgias.   Skin: Negative for rash and wound.   Allergic/Immunologic: Negative for environmental allergies.   Neurological: Negative for dizziness, syncope and numbness.   Hematological: Does not bruise/bleed easily.   Psychiatric/Behavioral: Negative for dysphoric mood and sleep disturbance. The patient is not nervous/anxious.          Past Medical History:   Diagnosis Date   • Disease of thyroid gland     Hypothyroid   • Factor 5 Leiden mutation, heterozygous (CMS/HCC)    • Hyperlipidemia     Controlled w/ SImvastatin   • Osteogenic sarcoma (CMS/HCC)     right leg     Past Surgical History:    Procedure Laterality Date   • ADENOIDECTOMY     • BELOW KNEE LEG AMPUTATION  1989    right leg   • HIP PINNING Right 4/9/2020    Procedure: HIP COMPRESSION SCREW-[place] compression plate;  Surgeon: Jorge Wilcox MD;  Location: [facility];  Service: Orthopedics;  Laterality: Right;   • SINUS SURGERY     • TONSILLECTOMY       Family History   Problem Relation Age of Onset   • Transient ischemic attack Mother    • Heart attack Brother    • Diabetes Brother    • Heart disease Brother         CAD w/ CABG         Objective:  There were no vitals filed for this visit.      04/20/20  1500   Weight: 97.1 kg (214 lb)     Body mass index is 33.52 kg/m².        Ortho Exam   He is alert and oriented x3.  States the wound is dry no drainage.  Still has some bruising and swelling in the wound laterally and some ecchymosis of the scrotum but again is doing better.    Assessment:        1. Closed displaced intertrochanteric fracture of right femur, initial encounter (CMS/[facility])    2. Status post orthopedic surgery, follow-up exam           Plan: Continue the aspirin 325 daily and the pain medicine as needed.  Return in 2 weeks with an x-ray of the right hip.  Answered all questions            Work Status:    [facility] query complete.    Orders:  No orders of the defined types were placed in this encounter.      Medications:  No orders of the defined types were placed in this encounter.      Followup:  Return in about 2 weeks (around 5/4/2020).          Dictated utilizing Dragon dictation

## 2020-04-24 ENCOUNTER — TELEPHONE (OUTPATIENT)
Dept: ORTHOPEDIC SURGERY | Facility: CLINIC | Age: 60
End: 2020-04-24

## 2020-04-24 DIAGNOSIS — Z09 STATUS POST ORTHOPEDIC SURGERY, FOLLOW-UP EXAM: ICD-10-CM

## 2020-04-24 DIAGNOSIS — S72.141A CLOSED DISPLACED INTERTROCHANTERIC FRACTURE OF RIGHT FEMUR, INITIAL ENCOUNTER (HCC): Primary | ICD-10-CM

## 2020-04-24 RX ORDER — OXYCODONE AND ACETAMINOPHEN 7.5; 325 MG/1; MG/1
1 TABLET ORAL EVERY 4 HOURS PRN
Qty: 42 TABLET | Refills: 0 | Status: SHIPPED | OUTPATIENT
Start: 2020-04-24 | End: 2020-05-14 | Stop reason: SDUPTHER

## 2020-04-24 NOTE — TELEPHONE ENCOUNTER
Patient calling requesting an RX refill of pain medication he last filled oxyCODONE-acetaminophen (PERCOCET) 7.5-325 MG per tablet-Sig: Take 2 tablets by mouth Every 4 (Four) Hours As Needed for Moderate Pain  or Severe Pain  for up to 9 days.  Dispense Quantity: 42 tablets.     Status post ORIF right intertrochanteric hip fracture 04.13.2020.     Last filled 04.10.2020.

## 2020-05-06 ENCOUNTER — OFFICE VISIT (OUTPATIENT)
Dept: ORTHOPEDIC SURGERY | Facility: CLINIC | Age: 60
End: 2020-05-06

## 2020-05-06 VITALS — WEIGHT: 214 LBS | BODY MASS INDEX: 33.59 KG/M2 | HEIGHT: 67 IN

## 2020-05-06 DIAGNOSIS — Z09 STATUS POST ORTHOPEDIC SURGERY, FOLLOW-UP EXAM: ICD-10-CM

## 2020-05-06 DIAGNOSIS — S72.141A CLOSED DISPLACED INTERTROCHANTERIC FRACTURE OF RIGHT FEMUR, INITIAL ENCOUNTER (HCC): Primary | ICD-10-CM

## 2020-05-06 PROCEDURE — 73502 X-RAY EXAM HIP UNI 2-3 VIEWS: CPT | Performed by: ORTHOPAEDIC SURGERY

## 2020-05-06 PROCEDURE — 99024 POSTOP FOLLOW-UP VISIT: CPT | Performed by: ORTHOPAEDIC SURGERY

## 2020-05-06 NOTE — PROGRESS NOTES
Subjective: Status post ORIF right intertrochanteric hip fracture     Patient ID: Charlie Solano is a 60 y.o. male.    Chief Complaint:    History of Present Illness patient is a little over 2 weeks out from his surgery.  He is slowly progressing.  Still having moderate pain unable to sit for any length of time which is not unexpected.       Social History     Occupational History   • Not on file   Tobacco Use   • Smoking status: Never Smoker   • Smokeless tobacco: Never Used   Substance and Sexual Activity   • Alcohol use: Not Currently     Comment: rarely   • Drug use: Never   • Sexual activity: Defer      Review of Systems   Constitutional: Negative for chills, diaphoresis, fever and unexpected weight change.   HENT: Negative for hearing loss, nosebleeds, sore throat and tinnitus.    Eyes: Negative for pain and visual disturbance.   Respiratory: Negative for cough, shortness of breath and wheezing.    Cardiovascular: Negative for chest pain and palpitations.   Gastrointestinal: Negative for abdominal pain, diarrhea, nausea and vomiting.   Endocrine: Negative for cold intolerance, heat intolerance and polydipsia.   Genitourinary: Negative for difficulty urinating, dysuria and hematuria.   Musculoskeletal: Positive for arthralgias, joint swelling and myalgias.   Skin: Negative for rash and wound.   Allergic/Immunologic: Negative for environmental allergies.   Neurological: Negative for dizziness, syncope and numbness.   Hematological: Does not bruise/bleed easily.   Psychiatric/Behavioral: Negative for dysphoric mood and sleep disturbance. The patient is not nervous/anxious.          Past Medical History:   Diagnosis Date   • Disease of thyroid gland     Hypothyroid   • Factor 5 Leiden mutation, heterozygous (CMS/HCC)    • Hyperlipidemia     Controlled w/ SImvastatin   • Osteogenic sarcoma (CMS/HCC)     right leg     Past Surgical History:   Procedure Laterality Date   • ADENOIDECTOMY     • BELOW KNEE LEG AMPUTATION   1989    right leg   • HIP PINNING Right 4/9/2020    Procedure: HIP COMPRESSION SCREW-[place] compression plate;  Surgeon: Jorge Wilcox MD;  Location: [facility];  Service: Orthopedics;  Laterality: Right;   • SINUS SURGERY     • TONSILLECTOMY       Family History   Problem Relation Age of Onset   • Transient ischemic attack Mother    • Heart attack Brother    • Diabetes Brother    • Heart disease Brother         CAD w/ CABG         Objective:  There were no vitals filed for this visit.      05/06/20  1146   Weight: 97.1 kg (214 lb)     Body mass index is 33.52 kg/m².        Ortho Exam   AP and oblique of the right hip shows excellent position of the implant with reduction of the hip.  His wounds completely benign.  Still has a swollen stump which is not unexpected minimal bruising or ecchymosis.  No drainage from his wound.    Assessment:        1. Closed displaced intertrochanteric fracture of right femur, initial encounter (CMS/[facility])    2. Status post orthopedic surgery, follow-up exam           Plan: Reviewed the x-rays with the patient and his wife.  Again is unable to sit which is not unexpected and I told him may be anywhere from 6 to 12 weeks before he can sit continuously return to work without any pain or discomfort.  He is to sit only as pain permits and is not to force the issue.  We do have fixation stabilization of the fracture we got to get this to heal first before proceeding with unrestricted activity and again this could take anywhere from 6 to 12 weeks.  He is to call when he needs a refill of pain medication return to see me in 4 weeks with a repeat x-ray of the right hip.  Off work until seen.  Answered all questions.            Work Status:    ELDER query complete.    Orders:  Orders Placed This Encounter   Procedures   • XR Hip With or Without Pelvis 2 - 3 View Right       Medications:  No orders of the defined types were placed in this encounter.      Followup:  Return in about 4 weeks  (around 6/3/2020).          Dictated utilizing Dragon dictation   Answers for HPI/ROS submitted by the patient on 4/29/2020   What is the primary reason for your visit?: Other  Please describe your symptoms.: Follow up to 4/9/20 surgery for hip fracture and related swelling in right limb, where in 1989 patient lost right leg above knee.   Difficulty sitting, excessive swelling in stump.  Have you had these symptoms before?: No  How long have you been having these symptoms?: 1-4 weeks ago  Please list any medications you are currently taking for this condition.: Oxycodone, 325 mg Aspirin, Stool softener  Please describe any probable cause for these symptoms. : Hip fracture at 4/8/20.

## 2020-05-14 ENCOUNTER — TELEPHONE (OUTPATIENT)
Dept: ORTHOPEDIC SURGERY | Facility: CLINIC | Age: 60
End: 2020-05-14

## 2020-05-14 DIAGNOSIS — Z09 STATUS POST ORTHOPEDIC SURGERY, FOLLOW-UP EXAM: ICD-10-CM

## 2020-05-14 DIAGNOSIS — S72.141A CLOSED DISPLACED INTERTROCHANTERIC FRACTURE OF RIGHT FEMUR, INITIAL ENCOUNTER (HCC): ICD-10-CM

## 2020-05-14 RX ORDER — OXYCODONE AND ACETAMINOPHEN 7.5; 325 MG/1; MG/1
1 TABLET ORAL EVERY 4 HOURS PRN
Qty: 42 TABLET | Refills: 0 | Status: SHIPPED | OUTPATIENT
Start: 2020-05-14 | End: 2020-06-03 | Stop reason: SDUPTHER

## 2020-05-14 NOTE — TELEPHONE ENCOUNTER
Patient calling requesting an RX refill of pain medication he last filled oxyCODONE-acetaminophen (PERCOCET) 7.5-325 MG per tablet-Sig: Take 1 tablet by mouth Every 4 (Four) Hours As Needed for Moderate Pain  or Severe Pain.  Dispense Quantity: 42 tablets.     Status post ORIF right intertrochanteric hip fracture 04.13.2020.     Last filled 04.24.2020.

## 2020-06-03 ENCOUNTER — OFFICE VISIT (OUTPATIENT)
Dept: ORTHOPEDIC SURGERY | Facility: CLINIC | Age: 60
End: 2020-06-03

## 2020-06-03 ENCOUNTER — TELEPHONE (OUTPATIENT)
Dept: ORTHOPEDIC SURGERY | Facility: CLINIC | Age: 60
End: 2020-06-03

## 2020-06-03 VITALS — HEIGHT: 67 IN | WEIGHT: 214 LBS | BODY MASS INDEX: 33.59 KG/M2

## 2020-06-03 DIAGNOSIS — S72.141A CLOSED DISPLACED INTERTROCHANTERIC FRACTURE OF RIGHT FEMUR, INITIAL ENCOUNTER (HCC): ICD-10-CM

## 2020-06-03 DIAGNOSIS — M65.30 TRIGGER FINGER, ACQUIRED: ICD-10-CM

## 2020-06-03 DIAGNOSIS — Z09 STATUS POST ORTHOPEDIC SURGERY, FOLLOW-UP EXAM: Primary | ICD-10-CM

## 2020-06-03 PROCEDURE — 73502 X-RAY EXAM HIP UNI 2-3 VIEWS: CPT | Performed by: ORTHOPAEDIC SURGERY

## 2020-06-03 PROCEDURE — 99024 POSTOP FOLLOW-UP VISIT: CPT | Performed by: ORTHOPAEDIC SURGERY

## 2020-06-03 RX ORDER — MELOXICAM 7.5 MG/1
7.5 TABLET ORAL DAILY
Qty: 30 TABLET | Refills: 5 | Status: SHIPPED | OUTPATIENT
Start: 2020-06-03

## 2020-06-03 RX ORDER — OXYCODONE AND ACETAMINOPHEN 7.5; 325 MG/1; MG/1
1 TABLET ORAL EVERY 4 HOURS PRN
Qty: 42 TABLET | Refills: 0 | Status: SHIPPED | OUTPATIENT
Start: 2020-06-03 | End: 2020-08-06

## 2020-06-03 NOTE — PROGRESS NOTES
Subjective: Status post ORIF right intertrochanteric hip fracture, stenosing tenosynovitis right long finger     Patient ID: Charlie Solano is a 60 y.o. male.    Chief Complaint:    History of Present Illness patient is 2 months status post surgery to the right hip fracture.  He is progressing slowly but still having difficulty sitting for more than 20 minutes or so as he would for work in his chair without having pain and discomfort although he states is slowly getting a little bit better but still has restrictions.  States some of the swelling is resolving.  Also still having problem with stenosing tenosynovitis of the right long finger that felt respond to cortisone injection.       Social History     Occupational History   • Not on file   Tobacco Use   • Smoking status: Never Smoker   • Smokeless tobacco: Never Used   Substance and Sexual Activity   • Alcohol use: Not Currently     Comment: rarely   • Drug use: Never   • Sexual activity: Defer      Review of Systems   Constitutional: Negative for chills, diaphoresis, fever and unexpected weight change.   HENT: Negative for hearing loss, nosebleeds, sore throat and tinnitus.    Eyes: Negative for pain and visual disturbance.   Respiratory: Negative for cough, shortness of breath and wheezing.    Cardiovascular: Negative for chest pain and palpitations.   Gastrointestinal: Negative for abdominal pain, diarrhea, nausea and vomiting.   Endocrine: Negative for cold intolerance, heat intolerance and polydipsia.   Genitourinary: Negative for difficulty urinating, dysuria and hematuria.   Musculoskeletal: Positive for arthralgias and myalgias. Negative for joint swelling.   Skin: Negative for rash and wound.   Allergic/Immunologic: Negative for environmental allergies.   Neurological: Negative for dizziness, syncope and numbness.   Hematological: Does not bruise/bleed easily.   Psychiatric/Behavioral: Negative for dysphoric mood and sleep disturbance. The patient is not  nervous/anxious.          Past Medical History:   Diagnosis Date   • Disease of thyroid gland     Hypothyroid   • Factor 5 Leiden mutation, heterozygous (CMS/HCC)    • Hyperlipidemia     Controlled w/ SImvastatin   • Osteogenic sarcoma (CMS/HCC)     right leg     Past Surgical History:   Procedure Laterality Date   • ADENOIDECTOMY     • BELOW KNEE LEG AMPUTATION  1989    right leg   • HIP PINNING Right 4/9/2020    Procedure: HIP COMPRESSION SCREW-[place] compression plate;  Surgeon: Jorge Wilcox MD;  Location: [facility];  Service: Orthopedics;  Laterality: Right;   • SINUS SURGERY     • TONSILLECTOMY       Family History   Problem Relation Age of Onset   • Transient ischemic attack Mother    • Heart attack Brother    • Diabetes Brother    • Heart disease Brother         CAD w/ CABG         Objective:  There were no vitals filed for this visit.      06/03/20  1301   Weight: 97.1 kg (214 lb)     Body mass index is 33.52 kg/m².        Ortho Exam    AP and lateral of the hip shows excellent position of the fracture and alignment.  Fracture line is not as visible today as initial postop x-ray.  All wounds are benign.  Again still cannot sit for long periods of time i.e. more than 20 minutes or so without having lateral and posterior buttock pain somewhat which are leading the radicular type pain probably from irritation of the sciatic nerve.  Skin is cool to touch.  Also having the symptoms of the stenosing tenosynovitis of the right long trigger.  He failed a cortisone injection by another physician.    Assessment:        1. Status post orthopedic surgery, follow-up exam    2. Closed displaced intertrochanteric fracture of right femur, initial encounter (CMS/[facility])    3. Trigger finger, acquired           Plan: Far as the hip is restrictions remain the same.  Cannot return to work with no restrictions at this time as he cannot really sit for more than 20 minutes at a time without discomfort.  The stenosing tenosynovitis  of the right long finger to try him on meloxicam for couple of weeks but it is since is already failed a cortisone injection and that does not give him complete relief of his symptoms I think surgical intervention release of the A2 pulley during his convalescence for his hip might be appropriate he will call back in 2 weeks if still symptomatic to schedule that surgery as far as the hip is concerned return to see me in 6 weeks with a repeat x-ray again off work until seen.  Answered all questions            Work Status:    ELDER query complete.    Orders:  Orders Placed This Encounter   Procedures   • XR Hip With or Without Pelvis 2 - 3 View Right       Medications:  New Medications Ordered This Visit   Medications   • oxyCODONE-acetaminophen (PERCOCET) 7.5-325 MG per tablet     Sig: Take 1 tablet by mouth Every 4 (Four) Hours As Needed for Moderate Pain  or Severe Pain .     Dispense:  42 tablet     Refill:  0   • meloxicam (MOBIC) 7.5 MG tablet     Sig: Take 1 tablet by mouth Daily.     Dispense:  30 tablet     Refill:  5       Followup:  Return in about 6 weeks (around 7/15/2020).          Dictated utilizing Dragon dictation

## 2020-06-03 NOTE — TELEPHONE ENCOUNTER
Patient calling asking if he needs to continue taking the ASA 325mg daily. Per Dr. Wilcox he can stop this now.    Status post ORIF right intertrochanteric hip fracture 04.13.2020.    Patient aware and agreeable.

## 2020-07-09 ENCOUNTER — OFFICE VISIT (OUTPATIENT)
Dept: ORTHOPEDIC SURGERY | Facility: CLINIC | Age: 60
End: 2020-07-09

## 2020-07-09 ENCOUNTER — PREP FOR SURGERY (OUTPATIENT)
Dept: OTHER | Facility: HOSPITAL | Age: 60
End: 2020-07-09

## 2020-07-09 VITALS — HEIGHT: 67 IN | WEIGHT: 214 LBS | BODY MASS INDEX: 33.59 KG/M2

## 2020-07-09 DIAGNOSIS — Z09 STATUS POST ORTHOPEDIC SURGERY, FOLLOW-UP EXAM: Primary | ICD-10-CM

## 2020-07-09 DIAGNOSIS — M65.30 TRIGGER FINGER, ACQUIRED: Primary | ICD-10-CM

## 2020-07-09 PROCEDURE — 99024 POSTOP FOLLOW-UP VISIT: CPT | Performed by: ORTHOPAEDIC SURGERY

## 2020-07-09 PROCEDURE — 73502 X-RAY EXAM HIP UNI 2-3 VIEWS: CPT | Performed by: ORTHOPAEDIC SURGERY

## 2020-07-09 NOTE — PROGRESS NOTES
Subjective: Status post ORIF right intertrochanteric hip fracture     Patient ID: Charlie Solano is a 60 y.o. male.    Chief Complaint:    History of Present Illness patient is just about 3 months out is doing better.  States he is having less discomfort is able to sit for long periods of time although is not completely asymptomatic.  He believes he is ready to try returning to work for maybe 2 hours a day.  He does work from home.  The trigger finger on the right long finger remains symptomatic.       Social History     Occupational History   • Not on file   Tobacco Use   • Smoking status: Never Smoker   • Smokeless tobacco: Never Used   Substance and Sexual Activity   • Alcohol use: Not Currently     Comment: rarely   • Drug use: Never   • Sexual activity: Defer      Review of Systems   Constitutional: Negative for chills, diaphoresis, fever and unexpected weight change.   HENT: Negative for hearing loss, nosebleeds, sore throat and tinnitus.    Eyes: Negative for pain and visual disturbance.   Respiratory: Negative for cough, shortness of breath and wheezing.    Cardiovascular: Negative for chest pain and palpitations.   Gastrointestinal: Negative for abdominal pain, diarrhea, nausea and vomiting.   Endocrine: Negative for cold intolerance, heat intolerance and polydipsia.   Genitourinary: Negative for difficulty urinating, dysuria and hematuria.   Musculoskeletal: Negative for arthralgias, joint swelling and myalgias.   Skin: Negative for rash and wound.   Allergic/Immunologic: Negative for environmental allergies.   Neurological: Negative for dizziness, syncope and numbness.   Hematological: Does not bruise/bleed easily.   Psychiatric/Behavioral: Negative for dysphoric mood and sleep disturbance. The patient is not nervous/anxious.          Past Medical History:   Diagnosis Date   • Disease of thyroid gland     Hypothyroid   • Factor 5 Leiden mutation, heterozygous (CMS/HCC)    • Hyperlipidemia     Controlled w/  SImvastatin   • Osteogenic sarcoma (CMS/HCC)     right leg     Past Surgical History:   Procedure Laterality Date   • ADENOIDECTOMY     • BELOW KNEE LEG AMPUTATION  1989    right leg   • HIP PINNING Right 4/9/2020    Procedure: HIP COMPRESSION SCREW-[place] compression plate;  Surgeon: Jorge Wilcox MD;  Location: [facility];  Service: Orthopedics;  Laterality: Right;   • SINUS SURGERY     • TONSILLECTOMY       Family History   Problem Relation Age of Onset   • Transient ischemic attack Mother    • Heart attack Brother    • Diabetes Brother    • Heart disease Brother         CAD w/ CABG         Objective:  There were no vitals filed for this visit.      07/09/20  1303   Weight: 97.1 kg (214 lb)     Body mass index is 33.52 kg/m².        Ortho Exam   AP and lateral of the right hip does show consolidation at the fracture site compared to previous x-rays in department.  Fracture is not seen.  He is complained of some stiffness in the hip which is not unexpected.  His pain is decreasing and is really off all pain medication at this time.  The triggering involving in his right ring finger continues and he wants to proceed with surgical release.  Also complaining of stiffness in both hands which may be related to the fact that he is using his crutches more in the past 3 months and previously therefore developing the morning stiffness.    Assessment:        1. Status post orthopedic surgery, follow-up exam           Plan: At this point the patient can try wearing his prosthesis although he states it is a new prosthesis is much heavier than his old wooden prosthesis whether he can get accustomed to it again at this point is undetermined but he certainly can try to weight-bear as tolerated with his prosthesis.  5 return to the work he wants to try to go back to working 2 hours a day for the next 2 weeks.  At the end of that 2-week.  He will call determine if he wants to increase it to 3 to 4 hours a day or leave it at 2  hours a day till I see him in 4 weeks.  Regarding the trigger finger we will get a schedule release of the trigger finger in the next 2 to 3 weeks will call to schedule that.  He will need a platform crutch in the right arm while he is recovering from the surgery to the right hand.  When return to see me in 4 weeks I do want a repeat x-ray of the right hip.  If the stiffness in the hip joint persist we may get involved in physical therapy at that time.  Continue the meloxicam until seen back.  I told him this whole recovery process regarding this fracture may take up to a year.            Work Status:    [employer] query complete.    Orders:  Orders Placed This Encounter   Procedures   • XR Hip With or Without Pelvis 2 - 3 View Right       Medications:  No orders of the defined types were placed in this encounter.      Followup:  Return in about 4 weeks (around 8/6/2020).          Dictated utilizing Dragon dictation

## 2020-07-23 ENCOUNTER — TELEPHONE (OUTPATIENT)
Dept: ORTHOPEDIC SURGERY | Facility: CLINIC | Age: 60
End: 2020-07-23

## 2020-07-23 NOTE — TELEPHONE ENCOUNTER
Wants to know if he can return to work,  going from 2 hours to 4 hours per day.  Is that OK, starting Monday.

## 2020-07-28 ENCOUNTER — TELEPHONE (OUTPATIENT)
Dept: ORTHOPEDIC SURGERY | Facility: CLINIC | Age: 60
End: 2020-07-28

## 2020-07-28 NOTE — TELEPHONE ENCOUNTER
Patient called. His wife is needing to have surgery, so he is needing to delay his. Would like to move it back to either August 28th or Sept 4th. Is this okay with you?

## 2020-07-29 ENCOUNTER — APPOINTMENT (OUTPATIENT)
Dept: PREADMISSION TESTING | Facility: HOSPITAL | Age: 60
End: 2020-07-29

## 2020-08-06 ENCOUNTER — OFFICE VISIT (OUTPATIENT)
Dept: ORTHOPEDIC SURGERY | Facility: CLINIC | Age: 60
End: 2020-08-06

## 2020-08-06 DIAGNOSIS — S72.141A CLOSED DISPLACED INTERTROCHANTERIC FRACTURE OF RIGHT FEMUR, INITIAL ENCOUNTER (HCC): Primary | ICD-10-CM

## 2020-08-06 PROCEDURE — 73502 X-RAY EXAM HIP UNI 2-3 VIEWS: CPT | Performed by: ORTHOPAEDIC SURGERY

## 2020-08-06 PROCEDURE — 99213 OFFICE O/P EST LOW 20 MIN: CPT | Performed by: ORTHOPAEDIC SURGERY

## 2020-08-06 NOTE — PROGRESS NOTES
Subjective: Status post ORIF right intertrochanteric hip fracture     Patient ID: Charlie Solano is a 60 y.o. male.    Chief Complaint:    History of Present Illness patient is almost 4 months out continues to improve.  Is able to sit for long period of time and again is only soreness is in the buttock musculature.  Still has weakness to the leg he states but again is not having significant any pain.       Social History     Occupational History   • Not on file   Tobacco Use   • Smoking status: Never Smoker   • Smokeless tobacco: Never Used   Substance and Sexual Activity   • Alcohol use: Not Currently     Comment: rarely   • Drug use: Never   • Sexual activity: Defer      Review of Systems   Constitutional: Negative for chills, diaphoresis, fever and unexpected weight change.   HENT: Negative for hearing loss, nosebleeds, sore throat and tinnitus.    Eyes: Negative for pain and visual disturbance.   Respiratory: Negative for cough, shortness of breath and wheezing.    Cardiovascular: Negative for chest pain and palpitations.   Gastrointestinal: Negative for abdominal pain, diarrhea, nausea and vomiting.   Endocrine: Negative for cold intolerance, heat intolerance and polydipsia.   Genitourinary: Negative for difficulty urinating, dysuria and hematuria.   Musculoskeletal: Positive for arthralgias. Negative for joint swelling and myalgias.   Skin: Negative for rash and wound.   Allergic/Immunologic: Negative for environmental allergies.   Neurological: Negative for dizziness, syncope and numbness.   Hematological: Does not bruise/bleed easily.   Psychiatric/Behavioral: Negative for dysphoric mood and sleep disturbance. The patient is not nervous/anxious.          Past Medical History:   Diagnosis Date   • Disease of thyroid gland     Hypothyroid   • Factor 5 Leiden mutation, heterozygous (CMS/HCC)    • Hyperlipidemia     Controlled w/ SImvastatin   • Osteogenic sarcoma (CMS/HCC)     right leg     Past Surgical History:    Procedure Laterality Date   • ADENOIDECTOMY     • BELOW KNEE LEG AMPUTATION  1989    right leg   • HIP PINNING Right 4/9/2020    Procedure: HIP COMPRESSION SCREW-[place] compression plate;  Surgeon: Jorge Wilcox MD;  Location: [facility];  Service: Orthopedics;  Laterality: Right;   • SINUS SURGERY     • TONSILLECTOMY       Family History   Problem Relation Age of Onset   • Transient ischemic attack Mother    • Heart attack Brother    • Diabetes Brother    • Heart disease Brother         CAD w/ CABG         Objective:  There were no vitals filed for this visit.  There were no vitals filed for this visit.  There is no height or weight on file to calculate BMI.        Ortho Exam   AP and lateral of the right hand shows complete consolidation at the fracture site.  The fracture line is not even visible compared to previous x-rays.  He is alert and oriented x3.  No motor or sensory deficit and wounds benign.  He does have weakness of the hip musculature.  States he cannot actively extend or flex the hip but there is no real tenderness to palpation.  States he cannot even wear his prosthesis now because it does not fit properly.  But again he is having no pain    Assessment:        1. Closed displaced intertrochanteric fracture of right femur, initial encounter (CMS/[facility])           Plan: Spent over 10 minutes with the patient discussing activity going forward.  He states he is ready return to work 8 hours a day.  He does work from home and is always caveat is the knee does need the ability to take breaks as needed because if he sits for too long period of time there are some soreness in the hip.  Are to send him to physical therapy for strengthening program to help with the musculature about the hip joint itself and I think this will help.  Okay to keep those restrictions in place until return to see me regarding the hip in 2 months.  He is return to see me in 3 weeks preparation for scheduling for release of a trigger  finger of the right hand.  Answered all questions.            Work Status:    ELDER query complete.    Orders:  Orders Placed This Encounter   Procedures   • XR Hip With or Without Pelvis 2 - 3 View Right   • Ambulatory Referral to Physical Therapy Evaluate and treat       Medications:  No orders of the defined types were placed in this encounter.      Followup:  Return in about 2 months (around 10/6/2020).          Dictated utilizing Dragon dictation

## 2020-08-26 ENCOUNTER — APPOINTMENT (OUTPATIENT)
Dept: PREADMISSION TESTING | Facility: HOSPITAL | Age: 60
End: 2020-08-26

## 2020-08-26 VITALS
HEIGHT: 68 IN | SYSTOLIC BLOOD PRESSURE: 153 MMHG | RESPIRATION RATE: 16 BRPM | WEIGHT: 220 LBS | HEART RATE: 77 BPM | DIASTOLIC BLOOD PRESSURE: 92 MMHG | OXYGEN SATURATION: 97 % | BODY MASS INDEX: 33.34 KG/M2

## 2020-08-26 DIAGNOSIS — M65.30 TRIGGER FINGER, ACQUIRED: ICD-10-CM

## 2020-08-26 LAB
ANION GAP SERPL CALCULATED.3IONS-SCNC: 11.8 MMOL/L (ref 5–15)
BASOPHILS # BLD AUTO: 0.04 10*3/MM3 (ref 0–0.2)
BASOPHILS NFR BLD AUTO: 0.6 % (ref 0–1.5)
BUN SERPL-MCNC: 14 MG/DL (ref 6–20)
BUN/CREAT SERPL: 11.9 (ref 7–25)
CALCIUM SPEC-SCNC: 9.4 MG/DL (ref 8.6–10.5)
CHLORIDE SERPL-SCNC: 104 MMOL/L (ref 98–107)
CO2 SERPL-SCNC: 25.2 MMOL/L (ref 22–29)
CREAT SERPL-MCNC: 1.18 MG/DL (ref 0.76–1.27)
DEPRECATED RDW RBC AUTO: 42.7 FL (ref 37–54)
EOSINOPHIL # BLD AUTO: 0.3 10*3/MM3 (ref 0–0.4)
EOSINOPHIL NFR BLD AUTO: 4.3 % (ref 0.3–6.2)
ERYTHROCYTE [DISTWIDTH] IN BLOOD BY AUTOMATED COUNT: 12.4 % (ref 12.3–15.4)
GFR SERPL CREATININE-BSD FRML MDRD: 63 ML/MIN/1.73
GLUCOSE SERPL-MCNC: 94 MG/DL (ref 65–99)
HCT VFR BLD AUTO: 48 % (ref 37.5–51)
HGB BLD-MCNC: 16.2 G/DL (ref 13–17.7)
IMM GRANULOCYTES # BLD AUTO: 0.03 10*3/MM3 (ref 0–0.05)
IMM GRANULOCYTES NFR BLD AUTO: 0.4 % (ref 0–0.5)
LYMPHOCYTES # BLD AUTO: 1.36 10*3/MM3 (ref 0.7–3.1)
LYMPHOCYTES NFR BLD AUTO: 19.7 % (ref 19.6–45.3)
MCH RBC QN AUTO: 31.3 PG (ref 26.6–33)
MCHC RBC AUTO-ENTMCNC: 33.8 G/DL (ref 31.5–35.7)
MCV RBC AUTO: 92.7 FL (ref 79–97)
MONOCYTES # BLD AUTO: 0.48 10*3/MM3 (ref 0.1–0.9)
MONOCYTES NFR BLD AUTO: 7 % (ref 5–12)
NEUTROPHILS NFR BLD AUTO: 4.69 10*3/MM3 (ref 1.7–7)
NEUTROPHILS NFR BLD AUTO: 68 % (ref 42.7–76)
PLATELET # BLD AUTO: 275 10*3/MM3 (ref 140–450)
PMV BLD AUTO: 9.3 FL (ref 6–12)
POTASSIUM SERPL-SCNC: 3.7 MMOL/L (ref 3.5–5.2)
RBC # BLD AUTO: 5.18 10*6/MM3 (ref 4.14–5.8)
SODIUM SERPL-SCNC: 141 MMOL/L (ref 136–145)
WBC # BLD AUTO: 6.9 10*3/MM3 (ref 3.4–10.8)

## 2020-08-26 PROCEDURE — 80048 BASIC METABOLIC PNL TOTAL CA: CPT | Performed by: ORTHOPAEDIC SURGERY

## 2020-08-26 PROCEDURE — C9803 HOPD COVID-19 SPEC COLLECT: HCPCS

## 2020-08-26 PROCEDURE — 36415 COLL VENOUS BLD VENIPUNCTURE: CPT

## 2020-08-26 PROCEDURE — 85025 COMPLETE CBC W/AUTO DIFF WBC: CPT | Performed by: ORTHOPAEDIC SURGERY

## 2020-08-26 PROCEDURE — U0004 COV-19 TEST NON-CDC HGH THRU: HCPCS | Performed by: ORTHOPAEDIC SURGERY

## 2020-08-26 PROCEDURE — U0002 COVID-19 LAB TEST NON-CDC: HCPCS | Performed by: ORTHOPAEDIC SURGERY

## 2020-08-26 RX ORDER — UBIDECARENONE 50 MG
50 CAPSULE ORAL DAILY
COMMUNITY

## 2020-08-26 RX ORDER — ASPIRIN 81 MG/1
81 TABLET ORAL DAILY
COMMUNITY

## 2020-08-27 LAB
REF LAB TEST METHOD: NORMAL
SARS-COV-2 RNA RESP QL NAA+PROBE: NOT DETECTED

## 2020-08-27 PROCEDURE — S0260 H&P FOR SURGERY: HCPCS | Performed by: ORTHOPAEDIC SURGERY

## 2020-08-28 ENCOUNTER — HOSPITAL ENCOUNTER (OUTPATIENT)
Facility: HOSPITAL | Age: 60
Setting detail: HOSPITAL OUTPATIENT SURGERY
Discharge: HOME | End: 2020-08-28
Attending: ORTHOPAEDIC SURGERY | Admitting: NURSE ANESTHETIST, CERTIFIED REGISTERED

## 2020-08-28 VITALS
RESPIRATION RATE: 15 BRPM | SYSTOLIC BLOOD PRESSURE: 140 MMHG | TEMPERATURE: 98.2 F | WEIGHT: 217.8 LBS | HEART RATE: 60 BPM | BODY MASS INDEX: 33.61 KG/M2 | OXYGEN SATURATION: 98 % | DIASTOLIC BLOOD PRESSURE: 86 MMHG

## 2020-08-28 DIAGNOSIS — M65.30 TRIGGER FINGER, ACQUIRED: ICD-10-CM

## 2020-08-28 PROCEDURE — 25010000002 MIDAZOLAM PER 1MG: Performed by: ORTHOPAEDIC SURGERY

## 2020-08-28 PROCEDURE — 26055 INCISE FINGER TENDON SHEATH: CPT | Performed by: ORTHOPAEDIC SURGERY

## 2020-08-28 RX ORDER — SODIUM CHLORIDE, SODIUM LACTATE, POTASSIUM CHLORIDE, CALCIUM CHLORIDE 600; 310; 30; 20 MG/100ML; MG/100ML; MG/100ML; MG/100ML
9 INJECTION, SOLUTION INTRAVENOUS CONTINUOUS
Status: DISCONTINUED | OUTPATIENT
Start: 2020-08-28 | End: 2020-08-28 | Stop reason: HOSPADM

## 2020-08-28 RX ORDER — SODIUM CHLORIDE 0.9 % (FLUSH) 0.9 %
10 SYRINGE (ML) INJECTION EVERY 12 HOURS SCHEDULED
Status: DISCONTINUED | OUTPATIENT
Start: 2020-08-28 | End: 2020-08-28 | Stop reason: HOSPADM

## 2020-08-28 RX ORDER — BUPIVACAINE HYDROCHLORIDE 5 MG/ML
INJECTION, SOLUTION PERINEURAL AS NEEDED
Status: DISCONTINUED | OUTPATIENT
Start: 2020-08-28 | End: 2020-08-28 | Stop reason: HOSPADM

## 2020-08-28 RX ORDER — MIDAZOLAM HYDROCHLORIDE 2 MG/2ML
1 INJECTION, SOLUTION INTRAMUSCULAR; INTRAVENOUS ONCE
Status: COMPLETED | OUTPATIENT
Start: 2020-08-28 | End: 2020-08-28

## 2020-08-28 RX ORDER — SODIUM CHLORIDE 9 MG/ML
40 INJECTION, SOLUTION INTRAVENOUS AS NEEDED
Status: DISCONTINUED | OUTPATIENT
Start: 2020-08-28 | End: 2020-08-28 | Stop reason: HOSPADM

## 2020-08-28 RX ORDER — BACITRACIN ZINC 500 [USP'U]/G
OINTMENT TOPICAL AS NEEDED
Status: DISCONTINUED | OUTPATIENT
Start: 2020-08-28 | End: 2020-08-28 | Stop reason: HOSPADM

## 2020-08-28 RX ORDER — LIDOCAINE HYDROCHLORIDE 10 MG/ML
0.5 INJECTION, SOLUTION EPIDURAL; INFILTRATION; INTRACAUDAL; PERINEURAL ONCE AS NEEDED
Status: DISCONTINUED | OUTPATIENT
Start: 2020-08-28 | End: 2020-08-28 | Stop reason: HOSPADM

## 2020-08-28 RX ORDER — SODIUM CHLORIDE 0.9 % (FLUSH) 0.9 %
10 SYRINGE (ML) INJECTION AS NEEDED
Status: DISCONTINUED | OUTPATIENT
Start: 2020-08-28 | End: 2020-08-28 | Stop reason: HOSPADM

## 2020-08-28 RX ORDER — MAGNESIUM HYDROXIDE 1200 MG/15ML
LIQUID ORAL AS NEEDED
Status: DISCONTINUED | OUTPATIENT
Start: 2020-08-28 | End: 2020-08-28 | Stop reason: HOSPADM

## 2020-08-28 RX ADMIN — MIDAZOLAM HYDROCHLORIDE 1 MG: 1 INJECTION, SOLUTION INTRAMUSCULAR; INTRAVENOUS at 07:47

## 2020-08-28 RX ADMIN — SODIUM CHLORIDE, POTASSIUM CHLORIDE, SODIUM LACTATE AND CALCIUM CHLORIDE 9 ML/HR: 600; 310; 30; 20 INJECTION, SOLUTION INTRAVENOUS at 06:51

## 2020-09-10 ENCOUNTER — OFFICE VISIT (OUTPATIENT)
Dept: ORTHOPEDIC SURGERY | Facility: CLINIC | Age: 60
End: 2020-09-10

## 2020-09-10 VITALS — HEIGHT: 68 IN | WEIGHT: 217 LBS | BODY MASS INDEX: 32.89 KG/M2

## 2020-09-10 DIAGNOSIS — S72.141A CLOSED DISPLACED INTERTROCHANTERIC FRACTURE OF RIGHT FEMUR, INITIAL ENCOUNTER (HCC): Primary | ICD-10-CM

## 2020-09-10 DIAGNOSIS — M65.30 TRIGGER FINGER, ACQUIRED: ICD-10-CM

## 2020-09-10 DIAGNOSIS — Z09 STATUS POST ORTHOPEDIC SURGERY, FOLLOW-UP EXAM: ICD-10-CM

## 2020-09-10 PROCEDURE — 99024 POSTOP FOLLOW-UP VISIT: CPT | Performed by: ORTHOPAEDIC SURGERY

## 2020-09-10 NOTE — PROGRESS NOTES
Subjective: Status post release right long trigger finger     Patient ID: Charlie Solano is a 60 y.o. male.    Chief Complaint:    History of Present Illness patient brought to his doing well.  All the symptoms have resolved       Social History     Occupational History   • Not on file   Tobacco Use   • Smoking status: Never Smoker   • Smokeless tobacco: Never Used   Substance and Sexual Activity   • Alcohol use: Not Currently     Comment: rarely   • Drug use: Never   • Sexual activity: Defer      Review of Systems   Constitutional: Negative for chills, diaphoresis, fever and unexpected weight change.   HENT: Negative for hearing loss, nosebleeds, sore throat and tinnitus.    Eyes: Negative for pain and visual disturbance.   Respiratory: Negative for cough, shortness of breath and wheezing.    Cardiovascular: Negative for chest pain and palpitations.   Gastrointestinal: Negative for abdominal pain, diarrhea, nausea and vomiting.   Endocrine: Negative for cold intolerance, heat intolerance and polydipsia.   Genitourinary: Negative for difficulty urinating, dysuria and hematuria.   Musculoskeletal: Positive for myalgias. Negative for arthralgias.   Skin: Negative for rash and wound.   Allergic/Immunologic: Negative for environmental allergies.   Neurological: Negative for dizziness, syncope and numbness.   Hematological: Does not bruise/bleed easily.   Psychiatric/Behavioral: Negative for dysphoric mood and sleep disturbance. The patient is not nervous/anxious.            Objective:      Ortho Exam   Wound is benign.  No erythema no drainage    Assessment:        1. Closed displaced intertrochanteric fracture of right femur, initial encounter (CMS/[facility])    2. Trigger finger, acquired    3. Status post orthopedic surgery, follow-up exam           Plan: New sterile dressing applied.  Return on September 21 for suture removal.                      Dictated utilizing Dragon dictation

## 2020-09-16 ENCOUNTER — HOSPITAL ENCOUNTER (OUTPATIENT)
Dept: PHYSICAL THERAPY | Facility: HOSPITAL | Age: 60
Setting detail: THERAPIES SERIES
Discharge: HOME | End: 2020-09-16

## 2020-09-16 DIAGNOSIS — S72.144D CLOSED NONDISPLACED INTERTROCHANTERIC FRACTURE OF RIGHT FEMUR WITH ROUTINE HEALING, SUBSEQUENT ENCOUNTER: ICD-10-CM

## 2020-09-16 DIAGNOSIS — S72.141A CLOSED DISPLACED INTERTROCHANTERIC FRACTURE OF RIGHT FEMUR, INITIAL ENCOUNTER (HCC): Primary | ICD-10-CM

## 2020-09-16 PROCEDURE — 97161 PT EVAL LOW COMPLEX 20 MIN: CPT

## 2020-09-16 NOTE — THERAPY EVALUATION
Outpatient Physical Therapy Ortho Initial Evaluation   Anna Reynolds     Patient Name: Charlie Solano  : 1960  MRN: [id number]  Today's Date: 2020      Visit Date: 2020    Patient Active Problem List   Diagnosis   • Intertrochanteric fracture of right hip (CMS/HCC)   • Closed nondisplaced intertrochanteric fracture of right femur (CMS/HCC)        Past Medical History:   Diagnosis Date   • Disease of thyroid gland     Hypothyroid   • Factor 5 Leiden mutation, heterozygous (CMS/HCC)    • Hyperlipidemia     Controlled w/ SImvastatin   • Osteogenic sarcoma (CMS/HCC)     right leg   • Sleep apnea     wears cpap        Past Surgical History:   Procedure Laterality Date   • ADENOIDECTOMY     • BELOW KNEE LEG AMPUTATION      right leg   • HIP PINNING Right 2020    Procedure: HIP COMPRESSION SCREW-[place] compression plate;  Surgeon: Jorge Wilcox MD;  Location: [facility] OR;  Service: Orthopedics;  Laterality: Right;   • SINUS SURGERY     • TONSILLECTOMY     • TRIGGER FINGER RELEASE Right 2020    Procedure: FINGER TRIGGER RELEASE-right long finger;  Surgeon: Jorge Wilcox MD;  Location:  [facility] OR;  Service: Orthopedics;  Laterality: Right;       Visit Dx:     ICD-10-CM ICD-9-CM   1. Closed displaced intertrochanteric fracture of right femur, initial encounter (CMS/[facility])  S72.141A 820.21   2. Closed nondisplaced intertrochanteric fracture of right femur with routine healing, subsequent encounter  S72.144D V54.13         Patient History     Row Name 20 0900             History    Chief Complaint  Difficulty Walking;Difficulty with daily activities;Muscle tenderness;Muscle weakness;Pain  -AS      Type of Pain  Hip pain Right  -AS      Brief Description of Current Complaint  Patient reports to outpatient PT with complaints of chronic right hip pain. Patient fx right hip in early 2020. He underwent ORIF the next day by Dr. Wilcox. He has done well overall but does continue to have some  pain and discomfort in his right hip. Patient is an AKA on the right side but occurred 30 years ago.  -AS      Previous treatment for THIS PROBLEM  Surgery  -AS      Surgery Date:  04/09/20  -AS      Patient/Caregiver Goals  Relieve pain;Improve mobility;Improve strength  -AS      Patient's Rating of General Health  Good  -AS      Hand Dominance  right-handed  -AS      Patient seeing anyone else for problem(s)?  Dr. Wilcox  -AS      How has patient tried to help current problem?  rest  -AS      What clinical tests have you had for this problem?  X-ray  -AS      Results of Clinical Tests  fx right hip  -AS      Surgery/Hospitalization  ORIF of right hip fx  -AS         Pain     Pain Location  Hip  -AS      Pain Frequency  Intermittent  -AS      Pain Description  Aching  -AS         Daily Activities    Primary Language  English  -AS      How does patient learn best?  Listening;Reading;Demonstration  -AS      Teaching needs identified  Home Exercise Program;Management of Condition  -AS      Patient is concerned about/has problems with  Climbing Stairs;Difficulty with self care (i.e. bathing, dressing, toileting:;Flexibility;Performing home management (household chores, shopping, care of dependents);Performing job responsibilities/community activities (work, school,;Performing sports, recreation, and play activities;Walking  -AS      Does patient have problems with the following?  None  -AS      Barriers to learning  None  -AS      Pt Participated in POC and Goals  Yes  -AS         Safety    Are you being hurt, hit, or frightened by anyone at home or in your life?  No  -AS      Are you being neglected by a caregiver  No  -AS        User Key  (r) = Recorded By, (t) = Taken By, (c) = Cosigned By    Initials Name Provider Type    AS Jose Miguel Kasper, PT Physical Therapist          PT Ortho     Row Name 09/16/20 0900       Precautions and Contraindications    Precautions/Limitations  no known precautions/limitations   -AS       Posture/Observations    Posture- WNL  Posture is WNL  -AS    Observations  Incision healing  -AS       Hip/Thigh Palpation    Greater Trochanter  Right:;Tender  -AS    Quadriceps  Right:;Tender  -AS       Right Lower Ext    Rt Hip ABduction AROM  25% limited  -AS    Rt Hip ADduction AROM  25% limited  -AS    Rt Hip Extension AROM  25% limited  -AS    Rt Hip Flexion AROM  25% limited  -AS    Rt Hip External Rotation AROM  25% limited  -AS    Rt Hip Internal Rotation AROM  25% limited  -AS       MMT Right Lower Ext    Rt Hip Flexion MMT, Gross Movement  (4/5) good  -AS    Rt Hip Extension MMT, Gross Movement  (4-/5) good minus  -AS    Rt Hip ABduction MMT, Gross Movement  (4-/5) good minus  -AS    Rt Hip ADduction MMT, Gross Movement  (4/5) good  -AS    Rt Hip Internal (Medial) Rotation MMT, Gross Movement  (4/5) good  -AS    Rt Hip External (Lateral) Rotation MMT, Gross Movement  (4/5) good  -AS       Sensation    Sensation WNL?  WNL  -AS    Light Touch  No apparent deficits  -AS       Lower Extremity Flexibility    Hamstrings  Right:;Mildly limited  -AS    Hip Flexors  Right:;Mildly limited  -AS    Hip External Rotators  Right:;Mildly limited  -AS       Gait/Stairs (Locomotion)    Comment (Gait/Stairs)  Patient ambulates with an antalgic gait pattern. Patient is an AKA on his right side.  -AS      User Key  (r) = Recorded By, (t) = Taken By, (c) = Cosigned By    Initials Name Provider Type    AS Jose Miguel Kasper, PT Physical Therapist                      Therapy Education  Given: HEP, Symptoms/condition management, Pain management  Program: New  How Provided: Verbal, Demonstration, Written  Provided to: Patient  Level of Understanding: Teach back education performed, Verbalized, Demonstrated     PT OP Goals     Row Name 09/16/20 0900          PT Short Term Goals    STG Date to Achieve  10/07/20  -AS     STG 1  Patient to demonstrate compliance with his initial HEP for flexibility, ROM and  strengthening.  -AS     STG 2  Patient to report left hip pain on VAS of 4-5/10 at worst with activity.  -AS     STG 3  Patient to demonstrate improved left hip strength to 4+/5 in all planes.  -AS        Long Term Goals    LTG Date to Achieve  10/28/20  -AS     LTG 1  Patient to demonstrate compliance with his advanced HEP for flexibility, ROM and strengthening.  -AS     LTG 2  Patient to report left hip pain on VAS of 0-1/10 at worst with activity.  -AS     LTG 3  Patient to demonstrate improved left hip strength to 5/5 in all planes.  -AS     LTG 4  Patient to demonstrate improved left hip ROM to WNL in all planes.  -AS        Time Calculation    PT Goal Re-Cert Due Date  10/14/20  -AS       User Key  (r) = Recorded By, (t) = Taken By, (c) = Cosigned By    Initials Name Provider Type    AS Jose Miguel Ksaper, PT Physical Therapist          PT Assessment/Plan     Row Name 09/16/20 0900          PT Assessment    Functional Limitations  Impaired gait;Impaired locomotion;Limitation in home management;Limitations in community activities;Performance in leisure activities;Performance in self-care ADL;Performance in sport activities;Performance in work activities  -AS     Impairments  Gait;Impaired flexibility;Joint mobility;Muscle strength;Pain;Range of motion  -AS     Assessment Comments  Patient reports to outpatient PT with complaints of chronic right hip pain. Patient fx right hip in early April 2020. He underwent ORIF the next day by Dr. Wilcox. Patient has limited right hip ROM, limited right hip strength, and increased right hip pain and discomfort with activity. Patient has limited function at this time secondary to the above.  -AS     Please refer to paper survey for additional self-reported information  Yes  -AS     Rehab Potential  Good  -AS     Patient/caregiver participated in establishment of treatment plan and goals  Yes  -AS     Patient would benefit from skilled therapy intervention  Yes  -AS         PT Plan    PT Frequency  One time visit  -AS     Predicted Duration of Therapy Intervention (OT)  --  -AS     Planned CPT's?  PT RE-EVAL: 11963;PT THER PROC EA 15 MIN: 64076;PT THER ACT EA 15 MIN: 03653;PT MANUAL THERAPY EA 15 MIN: 57201;PT NEUROMUSC RE-EDUCATION EA 15 MIN: 68476;PT ELECTRICAL STIM UNATTEND: ;PT ULTRASOUND EA 15 MIN: 24118;PT HOT/COLD PACK WC NONMCARE: 89312  -AS       User Key  (r) = Recorded By, (t) = Taken By, (c) = Cosigned By    Initials Name Provider Type    AS Jose Miguel Kasper, PT Physical Therapist            OP Exercises     Row Name 09/16/20 0900             Subjective Pain    Able to rate subjective pain?  yes  -AS      Pre-Treatment Pain Level  0  -AS      Post-Treatment Pain Level  0  -AS         Exercise 1    Exercise Name 1  Prone Hip Flexor Stretch  on roll  -AS      Time 1  5 min  -AS         Exercise 2    Exercise Name 2  3-Way Hip  -AS      Reps 2  25 each  -AS         Exercise 3    Exercise Name 3  Manual Joint Mobs  -AS      Time 3  5 min  -AS        User Key  (r) = Recorded By, (t) = Taken By, (c) = Cosigned By    Initials Name Provider Type    AS Jose Miguel Kasper, PT Physical Therapist                        Outcome Measure Options: Lower Extremity Functional Scale (LEFS)  Lower Extremity Functional Index  Any of your usual work, housework or school activities: A little bit of difficulty  Your usual hobbies, recreational or sporting activities: A little bit of difficulty  Getting into or out of the bath: A little bit of difficulty  Walking between rooms: No difficulty  Putting on your shoes or socks: A little bit of difficulty  Squatting: No difficulty  Lifting an object, like a bag of groceries from the floor: A little bit of difficulty  Performing light activities around your home: A little bit of difficulty  Performing heavy activities around your home: Moderate difficulty  Getting into or out of a car: A little bit of difficulty  Walking 2 blocks: No  difficulty  Walking a mile: No difficulty  Going up or down 10 stairs (about 1 flight of stairs): A little bit of difficulty  Standing for 1 hour: A little bit of difficulty  Sitting for 1 hour: A little bit of difficulty  Running on even ground: Extreme difficulty or unable to perform activity  Running on uneven ground: Extreme difficulty or unable to perform activity  Making sharp turns while running fast: Extreme difficulty or unable to perform activity  Hopping: Extreme difficulty or unable to perform activity  Rolling over in bed: Moderate difficulty  Total: 50      Time Calculation:     Start Time: 0924  Stop Time: 1016  Time Calculation (min): 52 min     Therapy Charges for Today     Code Description Service Date Service Provider Modifiers Qty    34164383154 HC PT EVAL LOW COMPLEXITY 3 9/16/2020 Jose Miguel Kasper, PT GP 1          PT G-Codes  Outcome Measure Options: Lower Extremity Functional Scale (LEFS)  Total: 50         Jose Miguel Kasper, PT  9/16/2020

## 2020-09-21 ENCOUNTER — OFFICE VISIT (OUTPATIENT)
Dept: ORTHOPEDIC SURGERY | Facility: CLINIC | Age: 60
End: 2020-09-21

## 2020-09-21 DIAGNOSIS — Z09 STATUS POST ORTHOPEDIC SURGERY, FOLLOW-UP EXAM: ICD-10-CM

## 2020-09-21 DIAGNOSIS — M65.30 TRIGGER FINGER, ACQUIRED: Primary | ICD-10-CM

## 2020-09-21 PROCEDURE — 99024 POSTOP FOLLOW-UP VISIT: CPT | Performed by: ORTHOPAEDIC SURGERY

## 2020-09-21 NOTE — PROGRESS NOTES
Subjective: Status post right long trigger release     Patient ID: Charlie Solano is a 60 y.o. male.    Chief Complaint:    History of Present Illness patient [facility] office surgery doing well.  All the symptoms have resolved       Social History     Occupational History   • Not on file   Tobacco Use   • Smoking status: Never Smoker   • Smokeless tobacco: Never Used   Substance and Sexual Activity   • Alcohol use: Not Currently     Comment: rarely   • Drug use: Never   • Sexual activity: Defer      Review of Systems   Constitutional: Negative for chills, diaphoresis, fever and unexpected weight change.   HENT: Negative for hearing loss, nosebleeds, sore throat and tinnitus.    Eyes: Negative for pain and visual disturbance.   Respiratory: Negative for cough, shortness of breath and wheezing.    Cardiovascular: Negative for chest pain and palpitations.   Gastrointestinal: Negative for abdominal pain, diarrhea, nausea and vomiting.   Endocrine: Negative for cold intolerance, heat intolerance and polydipsia.   Genitourinary: Negative for difficulty urinating, dysuria and hematuria.   Musculoskeletal: Positive for arthralgias and myalgias. Negative for joint swelling.   Skin: Negative for rash and wound.   Allergic/Immunologic: Negative for environmental allergies.   Neurological: Negative for dizziness, syncope and numbness.   Hematological: Does not bruise/bleed easily.   Psychiatric/Behavioral: Negative for dysphoric mood and sleep disturbance. The patient is not nervous/anxious.          Past Medical History:   Diagnosis Date   • Disease of thyroid gland     Hypothyroid   • Factor 5 Leiden mutation, heterozygous (CMS/HCC)    • Hyperlipidemia     Controlled w/ SImvastatin   • Osteogenic sarcoma (CMS/HCC)     right leg   • Sleep apnea     wears cpap     Past Surgical History:   Procedure Laterality Date   • ADENOIDECTOMY     • BELOW KNEE LEG AMPUTATION  1989    right leg   • HIP PINNING Right 4/9/2020    Procedure: HIP  COMPRESSION SCREW-[place] compression plate;  Surgeon: Jorge Wilcox MD;  Location: [facility] OR;  Service: Orthopedics;  Laterality: Right;   • SINUS SURGERY     • TONSILLECTOMY     • TRIGGER FINGER RELEASE Right 8/28/2020    Procedure: FINGER TRIGGER RELEASE-right long finger;  Surgeon: Jorge Wilcox MD;  Location:  [facility] OR;  Service: Orthopedics;  Laterality: Right;     Family History   Problem Relation Age of Onset   • Transient ischemic attack Mother    • Heart attack Brother    • Diabetes Brother    • Heart disease Brother         CAD w/ CABG         Objective:  There were no vitals filed for this visit.  There were no vitals filed for this visit.  There is no height or weight on file to calculate BMI.        Ortho Exam   He is alert and oriented x3.  The wound completely benign.  Sutures removed.    Assessment:        1. Trigger finger, acquired    2. Status post orthopedic surgery, follow-up exam           Plan: Return to see me PRN regarding the hand.  Does have a scheduled appointment October 7 and follow-up to the right intertrochanteric hip fracture which time he is to have an x-ray of that right hip.  Answered all questions            Work Status:    ELDER query complete.    Orders:  No orders of the defined types were placed in this encounter.      Medications:  No orders of the defined types were placed in this encounter.      Followup:  Return if symptoms worsen or fail to improve.          Dictated utilizing Dragon dictation

## 2020-10-07 ENCOUNTER — OFFICE VISIT (OUTPATIENT)
Dept: ORTHOPEDIC SURGERY | Facility: CLINIC | Age: 60
End: 2020-10-07

## 2020-10-07 VITALS — WEIGHT: 217 LBS | BODY MASS INDEX: 32.89 KG/M2 | HEIGHT: 68 IN

## 2020-10-07 DIAGNOSIS — S72.141A CLOSED DISPLACED INTERTROCHANTERIC FRACTURE OF RIGHT FEMUR, INITIAL ENCOUNTER (HCC): ICD-10-CM

## 2020-10-07 DIAGNOSIS — Z09 STATUS POST ORTHOPEDIC SURGERY, FOLLOW-UP EXAM: ICD-10-CM

## 2020-10-07 DIAGNOSIS — M65.30 TRIGGER FINGER, ACQUIRED: Primary | ICD-10-CM

## 2020-10-07 PROCEDURE — 99024 POSTOP FOLLOW-UP VISIT: CPT | Performed by: ORTHOPAEDIC SURGERY

## 2020-10-07 NOTE — PROGRESS NOTES
Subjective: Status post right hand trigger release     Patient ID: Charlie Solano is a 60 y.o. male.    Chief Complaint:    History of Present Illness patient is 6 weeks out from release of the trigger finger of the right hand is doing well.  A little sensitivity over the incisional area when he uses his crutches but his preoperative pain has resolved       Social History     Occupational History   • Not on file   Tobacco Use   • Smoking status: Never Smoker   • Smokeless tobacco: Never Used   Substance and Sexual Activity   • Alcohol use: Not Currently     Comment: rarely   • Drug use: Never   • Sexual activity: Defer      Review of Systems      Past Medical History:   Diagnosis Date   • Disease of thyroid gland     Hypothyroid   • Factor 5 Leiden mutation, heterozygous (CMS/HCC)    • Hyperlipidemia     Controlled w/ SImvastatin   • Osteogenic sarcoma (CMS/HCC)     right leg   • Sleep apnea     wears cpap     Past Surgical History:   Procedure Laterality Date   • ADENOIDECTOMY     • BELOW KNEE LEG AMPUTATION  1989    right leg   • HIP PINNING Right 4/9/2020    Procedure: HIP COMPRESSION SCREW-[place] compression plate;  Surgeon: Jorge Wilcox MD;  Location: [facility] OR;  Service: Orthopedics;  Laterality: Right;   • SINUS SURGERY     • TONSILLECTOMY     • TRIGGER FINGER RELEASE Right 8/28/2020    Procedure: FINGER TRIGGER RELEASE-right long finger;  Surgeon: Jorge Wilcox MD;  Location: [facility] OR;  Service: Orthopedics;  Laterality: Right;     Family History   Problem Relation Age of Onset   • Transient ischemic attack Mother    • Heart attack Brother    • Diabetes Brother    • Heart disease Brother         CAD w/ CABG         Objective:  There were no vitals filed for this visit.      10/07/20  0852   Weight: 98.4 kg (217 lb)     Body mass index is 33.49 kg/m².        Ortho Exam   Wound completely benign.  No evidence of triggering.  The right hip is also doing well.    Assessment:        1. Trigger finger, acquired     2. Status post orthopedic surgery, follow-up exam    3. Closed displaced intertrochanteric fracture of right femur, initial encounter (CMS/[facility])           Plan: Activity as tolerated regarding the hip and hand.  He discussed that he had taken in the past gabapentin for some periodic nerve pain that he has in that hip since he is at his amputation 31 years ago and it seemed to help and inquired about getting a prescription and I referred him to his family doctor.  Return to see me regarding the hip in 6 months with an x-ray of the right hip.  Answered all questions            Work Status:    ELDER query complete.    Orders:  No orders of the defined types were placed in this encounter.      Medications:  No orders of the defined types were placed in this encounter.      Followup:  Return in about 6 months (around 4/7/2021).          Dictated utilizing Dragon dictation

## 2020-11-12 ENCOUNTER — TELEPHONE (OUTPATIENT)
Dept: ORTHOPEDICS | Facility: OTHER | Age: 60
End: 2020-11-12

## 2020-11-12 NOTE — TELEPHONE ENCOUNTER
Provider: DR CAZARES  Caller: PATIENT  Relationship to Patient: SELF  Phone Number: [phone]  Reason for Call: PT CLLD JUST AS AN FYI THAT HE IS GOING TO THE DENTIST IN ABOUT A MONTH FOR SOME CLEANING AND SOME WORK AND PT WAS WONDERING IF DR CAZARES WOULD RECOMMEND HIM TAKING SOME TYPE OF ANITBIOTIC DURING THAT TIME TO HELP KEEP THE PATIENT FROM GETTING ANY TYPE OF INFECTION SINCE THE PT JUST HAD A PLATE AND SCREW PLACED IN HIS RIGHT HIP BACK IN 4/2020 . PATIENT IS ASKING FOR A CALL BACK RECOMMENDING HIM IF THIS IS NEEDED OR NOT. PT SAID IF SO THEN HIS PCP WILL GET HIM THE PRESCRIPTION.

## 2020-11-12 NOTE — TELEPHONE ENCOUNTER
Would you like Mr. Solano on a pre dental antibiotic?    See note below.    Status post ORIF right intertrochanteric hip fracture 04.13.2020.

## 2021-04-19 ENCOUNTER — OFFICE VISIT (OUTPATIENT)
Dept: ORTHOPEDIC SURGERY | Facility: CLINIC | Age: 61
End: 2021-04-19

## 2021-04-19 VITALS — WEIGHT: 217 LBS | HEIGHT: 67 IN | BODY MASS INDEX: 34.06 KG/M2

## 2021-04-19 DIAGNOSIS — M65.30 TRIGGER FINGER, ACQUIRED: ICD-10-CM

## 2021-04-19 DIAGNOSIS — Z09 STATUS POST ORTHOPEDIC SURGERY, FOLLOW-UP EXAM: ICD-10-CM

## 2021-04-19 DIAGNOSIS — M25.551 RIGHT HIP PAIN: Primary | ICD-10-CM

## 2021-04-19 DIAGNOSIS — S72.141A CLOSED DISPLACED INTERTROCHANTERIC FRACTURE OF RIGHT FEMUR, INITIAL ENCOUNTER (HCC): ICD-10-CM

## 2021-04-19 PROBLEM — M79.2: Status: ACTIVE | Noted: 2018-09-13

## 2021-04-19 PROBLEM — K90.41 GLUTEN INTOLERANCE: Status: ACTIVE | Noted: 2021-04-19

## 2021-04-19 PROBLEM — S88.911A AMPUTATED RIGHT LEG (HCC): Status: ACTIVE | Noted: 2021-04-19

## 2021-04-19 PROBLEM — Z99.89 OSA ON CPAP: Status: ACTIVE | Noted: 2018-09-13

## 2021-04-19 PROBLEM — G47.33 OSA ON CPAP: Status: ACTIVE | Noted: 2018-09-13

## 2021-04-19 PROBLEM — Z80.0 FAMILY HISTORY OF MALIGNANT NEOPLASM OF GASTROINTESTINAL TRACT: Status: ACTIVE | Noted: 2018-04-10

## 2021-04-19 PROBLEM — E03.9 HYPOTHYROIDISM, ADULT: Status: ACTIVE | Noted: 2019-03-13

## 2021-04-19 PROBLEM — E78.00 HYPERCHOLESTEROLEMIA: Status: ACTIVE | Noted: 2019-03-13

## 2021-04-19 PROBLEM — M65.331 TRIGGER FINGER, RIGHT MIDDLE FINGER: Status: ACTIVE | Noted: 2020-03-16

## 2021-04-19 PROCEDURE — 99212 OFFICE O/P EST SF 10 MIN: CPT | Performed by: ORTHOPAEDIC SURGERY

## 2021-04-19 PROCEDURE — 73502 X-RAY EXAM HIP UNI 2-3 VIEWS: CPT | Performed by: ORTHOPAEDIC SURGERY

## 2021-04-19 NOTE — PROGRESS NOTES
Subjective: Status post ORIF right intertrochanteric hip fracture with compression plate     Patient ID: Charlie Solano is a 61 y.o. male.    Chief Complaint:    History of Present Illness patient is 1 year anniversary from his surgery and is doing well.  Only complaint is when he lies on that right side.  Again he has an AK amputation secondary to osteosarcoma 37 years ago.  Again no complaints except when he lies on the right side with sleeping       Social History     Occupational History   • Not on file   Tobacco Use   • Smoking status: Never Smoker   • Smokeless tobacco: Never Used   Substance and Sexual Activity   • Alcohol use: Not Currently     Comment: rarely   • Drug use: Never   • Sexual activity: Defer      Review of Systems   Constitutional: Negative for chills, diaphoresis, fever and unexpected weight change.   HENT: Negative for hearing loss, nosebleeds, sore throat and tinnitus.    Eyes: Negative for pain and visual disturbance.   Respiratory: Negative for cough, shortness of breath and wheezing.    Cardiovascular: Negative for chest pain and palpitations.   Gastrointestinal: Negative for abdominal pain, diarrhea, nausea and vomiting.   Endocrine: Negative for cold intolerance, heat intolerance and polydipsia.   Genitourinary: Negative for difficulty urinating, dysuria and hematuria.   Musculoskeletal: Positive for arthralgias.   Skin: Negative for rash and wound.   Allergic/Immunologic: Negative for environmental allergies.   Neurological: Negative for dizziness, syncope and numbness.   Hematological: Does not bruise/bleed easily.   Psychiatric/Behavioral: Negative for dysphoric mood and sleep disturbance. The patient is not nervous/anxious.    All other systems reviewed and are negative.        Past Medical History:   Diagnosis Date   • Disease of thyroid gland     Hypothyroid   • Factor 5 Leiden mutation, heterozygous (CMS/HCC)    • Hyperlipidemia     Controlled w/ SImvastatin   • Osteogenic sarcoma  (CMS/[facility])     right leg   • Sleep apnea     wears cpap     Past Surgical History:   Procedure Laterality Date   • ADENOIDECTOMY     • BELOW KNEE LEG AMPUTATION  1989    right leg   • HIP PINNING Right 4/9/2020    Procedure: HIP COMPRESSION SCREW-[place] compression plate;  Surgeon: Jorge Wilcox MD;  Location: [facility] OR;  Service: Orthopedics;  Laterality: Right;   • SINUS SURGERY     • TONSILLECTOMY     • TRIGGER FINGER RELEASE Right 8/28/2020    Procedure: FINGER TRIGGER RELEASE-right long finger;  Surgeon: Jorge Wilcox MD;  Location:  [facility] OR;  Service: Orthopedics;  Laterality: Right;     Family History   Problem Relation Age of Onset   • Transient ischemic attack Mother    • Heart attack Brother    • Diabetes Brother    • Heart disease Brother         CAD w/ CABG         Objective:  There were no vitals filed for this visit.      04/19/21  1533   Weight: 98.4 kg (217 lb)     Body mass index is 33.66 kg/m².        Ortho Exam   AP and lateral of the right hip shows complete consolidation at the fracture site compared to previous x-rays the fracture is not visible at all.  His wounds are all benign.  There is no point tenderness just some vague soreness in the proximal gluteal musculature.    Assessment:        1. Right hip pain    2. Closed displaced intertrochanteric fracture of right femur, initial encounter (CMS/[facility])    3. Status post orthopedic surgery, follow-up exam    4. Trigger finger, acquired           Plan: Reviewed the x-rays with the patient.  I did advise amounts of Voltaren gel for that sore area that hopefully that will resolve.  He does think is slowly improving but hopefully the gel will hasten that recovery.  Is also complete recovery from the trigger finger release.  He thinks he is getting arthritis in his fingers and again advised the Voltaren gel for the hand also.  Return to see me as needed.  Answered all questions            Work Status:    ELDER query complete.    Orders:  Orders  Placed This Encounter   Procedures   • XR Hip With or Without Pelvis 2 - 3 View Right       Medications:  No orders of the defined types were placed in this encounter.      Followup:  Return if symptoms worsen or fail to improve.          Dictated utilizing Dragon dictation

## (undated) DEVICE — DRSNG PAD ABD 8X10IN STRL

## (undated) DEVICE — GLV SURG NEOLON 2G PF LF 8 STRL

## (undated) DEVICE — GUIDE PIN, THREADED TIP: Brand: OMEGA

## (undated) DEVICE — 3M™ IOBAN™ 2 ANTIMICROBIAL INCISE DRAPE 6640EZ: Brand: IOBAN™ 2

## (undated) DEVICE — GLV SURG SENSICARE ORTHO PF LF 8 STRL

## (undated) DEVICE — DRP C/ARM 41X74IN

## (undated) DEVICE — TP ELAS ELASTIKON 1IN

## (undated) DEVICE — TOWEL,OR,DSP,ST,BLUE,STD,4/PK,20PK/CS: Brand: MEDLINE

## (undated) DEVICE — INTENDED FOR TISSUE SEPARATION, AND OTHER PROCEDURES THAT REQUIRE A SHARP SURGICAL BLADE TO PUNCTURE OR CUT.: Brand: BARD-PARKER ® STAINLESS STEEL BLADES

## (undated) DEVICE — DRP Z/FRICTION 10X16IN

## (undated) DEVICE — SUT VIC TP1 SZ2 27IN J849G

## (undated) DEVICE — SPNG LAP 18X18IN LF STRL PK/5

## (undated) DEVICE — DISPOSABLE BIPOLAR CODE, 12' (3.66 M): Brand: CONMED

## (undated) DEVICE — GLV SURG EUDERMIC PF LTX 8 STRL

## (undated) DEVICE — SUT VIC 2/0 CP2 CR8 18IN J762D

## (undated) DEVICE — TRY SKINPREP WET CHG 4PCT SPNG HIB

## (undated) DEVICE — PK HIP PINNING 40

## (undated) DEVICE — DRSNG WND GZ CURAD OIL EMULSION 3X3IN STRL

## (undated) DEVICE — UNDYED BRAIDED (POLYGLACTIN 910), SYNTHETIC ABSORBABLE SUTURE: Brand: COATED VICRYL

## (undated) DEVICE — LAG MINOR PROCEDURE: Brand: MEDLINE INDUSTRIES, INC.

## (undated) DEVICE — SUT ETHLN 5/0 FS2 18IN 661H

## (undated) DEVICE — SYS SKIN CLS DERMABOND PRINEO W/22CM MESH TP

## (undated) DEVICE — HIGH PERFORMANCE BI-DRIVE BONE DRILL

## (undated) DEVICE — BNDG GZ SOF-FORM CONFRM 2X75IN LF STRL

## (undated) DEVICE — CONTAINER,SPECIMEN,OR STERILE,4OZ: Brand: MEDLINE

## (undated) DEVICE — SYR LL TP 10ML STRL

## (undated) DEVICE — SUT VIC 3/0 SH 27IN J416H

## (undated) DEVICE — DRSNG TELFA PAD NONADH STR 1S 3X8IN

## (undated) DEVICE — SPNG GZ WOVN 4X4IN 12PLY 10/BX STRL

## (undated) DEVICE — KT DRN EVAC WND PVC 7F3/32IN 400CC

## (undated) DEVICE — Device: Brand: LIGHT GUARD™ FLEXIBLE LIGHT HANDLE COVER (1 EACH/PKG)

## (undated) DEVICE — IMPLANTABLE DEVICE
Type: IMPLANTABLE DEVICE | Site: HIP | Status: NON-FUNCTIONAL
Removed: 2020-04-09

## (undated) DEVICE — 1016 S-DRAPE IRRIG POUCH 10/BOX: Brand: STERI-DRAPE™

## (undated) DEVICE — TRANSPOSAL ULTRAFLEX DUO/QUAD ULTRA CART MANIFOLD

## (undated) DEVICE — 3M™ MEDIPORE™ H SOFT CLOTH SURGICAL TAPE 2864, 4 INCH X 10 YARD (10CM X 9,14M), 12 ROLLS/CASE: Brand: 3M™ MEDIPORE™

## (undated) DEVICE — APPL CHLORAPREP W/TINT 26ML ORNG

## (undated) DEVICE — DRAPE,REIN 53X77,STERILE: Brand: MEDLINE